# Patient Record
Sex: FEMALE | Race: WHITE | NOT HISPANIC OR LATINO | Employment: FULL TIME | URBAN - METROPOLITAN AREA
[De-identification: names, ages, dates, MRNs, and addresses within clinical notes are randomized per-mention and may not be internally consistent; named-entity substitution may affect disease eponyms.]

---

## 2017-09-18 ENCOUNTER — TELEPHONE (OUTPATIENT)
Dept: PRIMARY CARE CLINIC | Facility: CLINIC | Age: 52
End: 2017-09-18

## 2017-09-18 NOTE — TELEPHONE ENCOUNTER
----- Message from Reina Willett sent at 9/15/2017 11:49 AM CDT -----  Contact:   call  //[phone]    Pt  Is  Having   anxiety  And  Panic  Attacks  And  Wants  To  Be see today //  Pt  Stated   She has  [employer]  ,  Card not available ////  Pt was advise  About    250  Dep  If  Not ins  Verification

## 2017-09-21 ENCOUNTER — OFFICE VISIT (OUTPATIENT)
Dept: PRIMARY CARE CLINIC | Facility: CLINIC | Age: 52
End: 2017-09-21

## 2017-09-21 VITALS
TEMPERATURE: 98 F | BODY MASS INDEX: 26.13 KG/M2 | WEIGHT: 142 LBS | HEART RATE: 74 BPM | HEIGHT: 62 IN | SYSTOLIC BLOOD PRESSURE: 113 MMHG | RESPIRATION RATE: 18 BRPM | DIASTOLIC BLOOD PRESSURE: 76 MMHG | OXYGEN SATURATION: 100 %

## 2017-09-21 DIAGNOSIS — F41.9 ANXIETY: ICD-10-CM

## 2017-09-21 DIAGNOSIS — S39.012A LUMBOSACRAL STRAIN, INITIAL ENCOUNTER: Primary | ICD-10-CM

## 2017-09-21 DIAGNOSIS — N95.1 MENOPAUSAL SYNDROME: ICD-10-CM

## 2017-09-21 DIAGNOSIS — K50.919 CROHN'S DISEASE WITH COMPLICATION, UNSPECIFIED GASTROINTESTINAL TRACT LOCATION: ICD-10-CM

## 2017-09-21 DIAGNOSIS — E66.3 OVERWEIGHT: ICD-10-CM

## 2017-09-21 PROCEDURE — 99213 OFFICE O/P EST LOW 20 MIN: CPT | Mod: 25,S$GLB,, | Performed by: FAMILY MEDICINE

## 2017-09-21 PROCEDURE — 96372 THER/PROPH/DIAG INJ SC/IM: CPT | Mod: S$GLB,,, | Performed by: FAMILY MEDICINE

## 2017-09-21 RX ORDER — METHYLPREDNISOLONE 4 MG/1
TABLET ORAL
Qty: 1 PACKAGE | Refills: 0 | Status: SHIPPED | OUTPATIENT
Start: 2017-09-21 | End: 2017-10-12

## 2017-09-21 RX ORDER — DICLOFENAC SODIUM 75 MG/1
75 TABLET, DELAYED RELEASE ORAL DAILY
COMMUNITY
End: 2017-09-21 | Stop reason: SDUPTHER

## 2017-09-21 RX ORDER — DICLOFENAC SODIUM 75 MG/1
TABLET, DELAYED RELEASE ORAL
Qty: 60 TABLET | Refills: 5 | Status: SHIPPED | OUTPATIENT
Start: 2017-09-21 | End: 2018-07-03

## 2017-09-21 RX ORDER — BETAMETHASONE SODIUM PHOSPHATE AND BETAMETHASONE ACETATE 3; 3 MG/ML; MG/ML
6 INJECTION, SUSPENSION INTRA-ARTICULAR; INTRALESIONAL; INTRAMUSCULAR; SOFT TISSUE
Status: COMPLETED | OUTPATIENT
Start: 2017-09-21 | End: 2017-09-21

## 2017-09-21 RX ORDER — CYCLOBENZAPRINE HCL 10 MG
10 TABLET ORAL 3 TIMES DAILY PRN
Qty: 30 TABLET | Refills: 1 | Status: SHIPPED | OUTPATIENT
Start: 2017-09-21 | End: 2017-10-01

## 2017-09-21 RX ORDER — HYDROCODONE BITARTRATE AND ACETAMINOPHEN 5; 325 MG/1; MG/1
1 TABLET ORAL EVERY 6 HOURS PRN
Qty: 30 TABLET | Refills: 0 | Status: SHIPPED | OUTPATIENT
Start: 2017-09-21 | End: 2018-07-03

## 2017-09-21 RX ADMIN — BETAMETHASONE SODIUM PHOSPHATE AND BETAMETHASONE ACETATE 6 MG: 3; 3 INJECTION, SUSPENSION INTRA-ARTICULAR; INTRALESIONAL; INTRAMUSCULAR; SOFT TISSUE at 12:09

## 2017-09-21 NOTE — PROGRESS NOTES
Subjective:       Patient ID: Sana Braden is a 52 y.o. female.    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 Able get up just sore . Using back brace helps little. Hurt bend, lift, squatt. Wakes pt up sleep. Sore getting OOB--OK dressing sore bending over. Hurt back long time ago lifting heavy stuff. No luous, Rhey arth, gout, no OA or fx. Work -- [employer].     ROS:  Skin: no psoriasis, eczema, +skin cancer 5 yrs ago   HEENT: No headache, ocular pain, blurred vision, diplopia, epistaxis, hoarseness change in voice, thyroid trouble  Lung: No pneumonia, asthma, Tb, wheezing, SOB,  Heart: No chest pain, ankle edema, palpitations, MI, phu murmur, hypertension, hyperlipidemia  Abdomen:Crohn's --see HPI  No nausea, vomiting, diarrhea, constipation, ulcers, hepatitis, gallbladder disease, melena, hematochezia, hematemesis  : no UTI, renal disease, stones  GYN hyst -- night sweats, nausea, irritable , tired--OTC estrogen --?? Menopause  -- wants see GYN  MS: no fractures, O/A, lupus, rheumatoid, gout  Neuro: No dizziness, LOC, seizures   No diabetes, no anemia, no anxiety, no depression  Single , no children lives alone      Objective:   Physical Exam:  General: Well nourished, well developed, no acute distress  Skin: No lesions  HEENT: Eyes PERRLA, EOM intact, nose patent, throat non-erythematous   NECK: Supple, no bruits, No JVD, no nodes  Lungs: Clear, no rales, rhonchi, wheezing  Heart: Regular rate and rhythm, no murmurs, gallops, or rubs  Abdomen: flat, bowel sounds positive, no tenderness, or organomegaly  MS: Range of motion and muscle strength intact  Neuro: Alert, CN intact, oriented X 3  Extremities: No cyanosis, clubbing, or edema         Assessment:       1. Lumbosacral strain, initial encounter    2. Crohn's disease with complication, unspecified gastrointestinal tract location    3. Anxiety    4.  Overweight    5. Menopausal syndrome        Plan:       Lumbosacral strain, initial encounter  -     X-Ray Lumbar Spine Complete 5 View; Future; Expected date: 09/21/2017    Crohn's disease with complication, unspecified gastrointestinal tract location    Anxiety    Overweight    Menopausal syndrome  -     Ambulatory Referral to Obstetrics / Gynecology  -     Mammo Digital Screening Bilateral With CAD; Future; Expected date: 09/21/2017    Other orders  -     betamethasone acetate-betamethasone sodium phosphate injection 6 mg; Inject 1 mL (6 mg total) into the muscle one time.  -     hydrocodone-acetaminophen 5-325mg (NORCO) 5-325 mg per tablet; Take 1 tablet by mouth every 6 (six) hours as needed.  Dispense: 30 tablet; Refill: 0  -     methylPREDNISolone (MEDROL DOSEPACK) 4 mg tablet; use as directed  Dispense: 1 Package; Refill: 0  -     cyclobenzaprine (FLEXERIL) 10 MG tablet; Take 1 tablet (10 mg total) by mouth 3 (three) times daily as needed for Muscle spasms.  Dispense: 30 tablet; Refill: 1  -     diclofenac (VOLTAREN) 75 MG EC tablet; 1 po bid after medrol and no other ND+SAID's  Dispense: 60 tablet; Refill: 5

## 2018-07-03 ENCOUNTER — OFFICE VISIT (OUTPATIENT)
Dept: PRIMARY CARE CLINIC | Facility: CLINIC | Age: 53
End: 2018-07-03
Payer: COMMERCIAL

## 2018-07-03 VITALS
DIASTOLIC BLOOD PRESSURE: 83 MMHG | WEIGHT: 143.19 LBS | RESPIRATION RATE: 18 BRPM | TEMPERATURE: 99 F | HEIGHT: 61 IN | HEART RATE: 84 BPM | SYSTOLIC BLOOD PRESSURE: 126 MMHG | OXYGEN SATURATION: 98 % | BODY MASS INDEX: 27.03 KG/M2

## 2018-07-03 DIAGNOSIS — N39.0 RECURRENT UTI: Primary | ICD-10-CM

## 2018-07-03 LAB
BILIRUB SERPL-MCNC: ABNORMAL MG/DL
BLOOD URINE, POC: ABNORMAL
COLOR, POC UA: YELLOW
GLUCOSE UR QL STRIP: NEGATIVE
KETONES UR QL STRIP: NEGATIVE
LEUKOCYTE ESTERASE URINE, POC: ABNORMAL
NITRITE, POC UA: NEGATIVE
PH, POC UA: 5
PROTEIN, POC: NEGATIVE
SPECIFIC GRAVITY, POC UA: 1
UROBILINOGEN, POC UA: NEGATIVE

## 2018-07-03 PROCEDURE — 99213 OFFICE O/P EST LOW 20 MIN: CPT | Mod: 25,S$GLB,, | Performed by: INTERNAL MEDICINE

## 2018-07-03 PROCEDURE — 99999 PR PBB SHADOW E&M-EST. PATIENT-LVL IV: CPT | Mod: PBBFAC,,, | Performed by: INTERNAL MEDICINE

## 2018-07-03 PROCEDURE — 81002 URINALYSIS NONAUTO W/O SCOPE: CPT | Mod: S$GLB,,, | Performed by: INTERNAL MEDICINE

## 2018-07-03 PROCEDURE — 3008F BODY MASS INDEX DOCD: CPT | Mod: CPTII,S$GLB,, | Performed by: INTERNAL MEDICINE

## 2018-07-03 RX ORDER — HYDROCODONE BITARTRATE AND ACETAMINOPHEN 5; 325 MG/1; MG/1
1 TABLET ORAL EVERY 6 HOURS PRN
Qty: 20 TABLET | Refills: 0 | Status: SHIPPED | OUTPATIENT
Start: 2018-07-03 | End: 2018-08-23 | Stop reason: ALTCHOICE

## 2018-07-03 RX ORDER — SULFAMETHOXAZOLE AND TRIMETHOPRIM 800; 160 MG/1; MG/1
1 TABLET ORAL 2 TIMES DAILY
Qty: 20 TABLET | Refills: 0 | Status: SHIPPED | OUTPATIENT
Start: 2018-07-03 | End: 2018-07-13

## 2018-07-03 RX ORDER — PHENAZOPYRIDINE HYDROCHLORIDE 200 MG/1
200 TABLET, FILM COATED ORAL 3 TIMES DAILY
Qty: 6 TABLET | Refills: 0 | Status: SHIPPED | OUTPATIENT
Start: 2018-07-03 | End: 2018-07-05

## 2018-07-03 NOTE — PROGRESS NOTES
Subjective:       Patient ID: Sana Braden is a 53 y.o. female.    Chief Complaint: Urinary Tract Infection    HPI  Pt c/o burning with urination since yesterday and see blood in urine last night and rt flank pain no fever chill no n/v/d had UTI x 3 this yr  Review of Systems    Objective:      Physical Exam   Constitutional: She is oriented to person, place, and time. She appears well-developed and well-nourished. No distress.   HENT:   Head: Normocephalic and atraumatic.   Right Ear: External ear normal.   Left Ear: External ear normal.   Nose: Nose normal.   Mouth/Throat: Oropharynx is clear and moist. No oropharyngeal exudate.   Eyes: Conjunctivae and EOM are normal. Pupils are equal, round, and reactive to light. Right eye exhibits no discharge. Left eye exhibits no discharge.   Neck: Normal range of motion. Neck supple. No thyromegaly present.   Cardiovascular: Normal rate, regular rhythm, normal heart sounds and intact distal pulses.  Exam reveals no gallop and no friction rub.    No murmur heard.  Pulmonary/Chest: Effort normal and breath sounds normal. No respiratory distress. She has no wheezes. She has no rales. She exhibits no tenderness.   Abdominal: Soft. Bowel sounds are normal. She exhibits no distension. There is no tenderness. There is no rebound and no guarding.   Musculoskeletal: Normal range of motion. She exhibits no edema, tenderness or deformity.   Lymphadenopathy:     She has no cervical adenopathy.   Neurological: She is alert and oriented to person, place, and time.   Skin: Skin is warm and dry. Capillary refill takes less than 2 seconds. No rash noted. No erythema.   Psychiatric: She has a normal mood and affect. Judgment and thought content normal.   Nursing note and vitals reviewed.      Assessment:       1. Recurrent UTI        Plan:       Recurrent UTI  -     sulfamethoxazole-trimethoprim 800-160mg (BACTRIM DS) 800-160 mg Tab; Take 1 tablet by mouth 2 (two) times daily. for 10 days   Dispense: 20 tablet; Refill: 0  -     phenazopyridine (PYRIDIUM) 200 MG tablet; Take 1 tablet (200 mg total) by mouth 3 (three) times daily. for 2 days  Dispense: 6 tablet; Refill: 0  -     HYDROcodone-acetaminophen (NORCO) 5-325 mg per tablet; Take 1 tablet by mouth every 6 (six) hours as needed for Pain.  Dispense: 20 tablet; Refill: 0  -     US Retroperitoneal Complete; Future; Expected date: 07/04/2018  -     POCT URINE DIPSTICK WITHOUT MICROSCOPE

## 2018-07-09 ENCOUNTER — TELEPHONE (OUTPATIENT)
Dept: PRIMARY CARE CLINIC | Facility: CLINIC | Age: 53
End: 2018-07-09

## 2018-07-09 NOTE — TELEPHONE ENCOUNTER
----- Message from Evy Delacruz sent at 7/9/2018  3:21 PM CDT -----  Contact: Patient  Type:  Patient Returning Call    Who Called:  Sana,patient  Who Left Message for Patient:  ?  Does the patient know what this is regarding?:  Results  Best Call Back Number:  [phone]  Additional Information:  Missed your call, please call her back. Thanks.

## 2018-07-09 NOTE — TELEPHONE ENCOUNTER
----- Message from Evy Delacruz sent at 7/9/2018  3:21 PM CDT -----  Contact: Patient  Type:  Patient Returning Call    Who Called:  Sana,patient  Who Left Message for Patient:  ?  Does the patient know what this is regarding?:  Results  Best Call Back Number:  [phone]  Additional Information:  Missed your call, please call her back. Thanks.

## 2018-08-23 ENCOUNTER — OFFICE VISIT (OUTPATIENT)
Dept: PRIMARY CARE CLINIC | Facility: CLINIC | Age: 53
End: 2018-08-23
Payer: COMMERCIAL

## 2018-08-23 VITALS
TEMPERATURE: 99 F | RESPIRATION RATE: 18 BRPM | WEIGHT: 142 LBS | DIASTOLIC BLOOD PRESSURE: 74 MMHG | OXYGEN SATURATION: 99 % | BODY MASS INDEX: 26.81 KG/M2 | SYSTOLIC BLOOD PRESSURE: 115 MMHG | HEART RATE: 76 BPM | HEIGHT: 61 IN

## 2018-08-23 DIAGNOSIS — N30.00 ACUTE CYSTITIS WITHOUT HEMATURIA: Primary | ICD-10-CM

## 2018-08-23 DIAGNOSIS — Z12.11 COLON CANCER SCREENING: ICD-10-CM

## 2018-08-23 DIAGNOSIS — Z00.00 WELLNESS EXAMINATION: ICD-10-CM

## 2018-08-23 DIAGNOSIS — K50.919 CROHN'S DISEASE WITH COMPLICATION, UNSPECIFIED GASTROINTESTINAL TRACT LOCATION: ICD-10-CM

## 2018-08-23 DIAGNOSIS — M54.50 ACUTE MIDLINE LOW BACK PAIN WITHOUT SCIATICA: ICD-10-CM

## 2018-08-23 DIAGNOSIS — Z12.31 ENCOUNTER FOR SCREENING MAMMOGRAM FOR BREAST CANCER: ICD-10-CM

## 2018-08-23 LAB
BILIRUB SERPL-MCNC: ABNORMAL MG/DL
BLOOD URINE, POC: ABNORMAL
COLOR, POC UA: YELLOW
GLUCOSE UR QL STRIP: NORMAL
KETONES UR QL STRIP: ABNORMAL
LEUKOCYTE ESTERASE URINE, POC: ABNORMAL
NITRITE, POC UA: ABNORMAL
PH, POC UA: 5
PROTEIN, POC: ABNORMAL
SPECIFIC GRAVITY, POC UA: 1.01
UROBILINOGEN, POC UA: NORMAL

## 2018-08-23 PROCEDURE — 99213 OFFICE O/P EST LOW 20 MIN: CPT | Mod: 25,S$GLB,, | Performed by: INTERNAL MEDICINE

## 2018-08-23 PROCEDURE — 81002 URINALYSIS NONAUTO W/O SCOPE: CPT | Mod: S$GLB,,, | Performed by: INTERNAL MEDICINE

## 2018-08-23 PROCEDURE — 99999 PR PBB SHADOW E&M-EST. PATIENT-LVL V: CPT | Mod: PBBFAC,,, | Performed by: INTERNAL MEDICINE

## 2018-08-23 PROCEDURE — 3008F BODY MASS INDEX DOCD: CPT | Mod: CPTII,S$GLB,, | Performed by: INTERNAL MEDICINE

## 2018-08-23 RX ORDER — CIPROFLOXACIN 500 MG/1
500 TABLET ORAL EVERY 12 HOURS
Qty: 20 TABLET | Refills: 0 | Status: SHIPPED | OUTPATIENT
Start: 2018-08-23 | End: 2018-09-02

## 2018-08-23 RX ORDER — HYDROCODONE BITARTRATE AND ACETAMINOPHEN 5; 325 MG/1; MG/1
1 TABLET ORAL EVERY 8 HOURS PRN
Qty: 15 TABLET | Refills: 0 | Status: SHIPPED | OUTPATIENT
Start: 2018-08-23 | End: 2018-11-05

## 2018-08-24 NOTE — PROGRESS NOTES
Subjective:       Patient ID: Sana Braden is a 53 y.o. female.    Chief Complaint: Urinary Tract Infection    HPI  Pt c/o UTI symptoms lower back pain urinary frequency lower abd pain x 3 days not better has Crohn ds had arie scope yrly not pregnant no fever chilll no n/v/d  Review of Systems    Objective:      Physical Exam   Constitutional: She is oriented to person, place, and time. She appears well-developed and well-nourished. No distress.   HENT:   Head: Normocephalic and atraumatic.   Right Ear: External ear normal.   Left Ear: External ear normal.   Nose: Nose normal.   Mouth/Throat: Oropharynx is clear and moist. No oropharyngeal exudate.   Eyes: Conjunctivae and EOM are normal. Pupils are equal, round, and reactive to light. Right eye exhibits no discharge. Left eye exhibits no discharge.   Neck: Normal range of motion. Neck supple. No thyromegaly present.   Cardiovascular: Normal rate, regular rhythm, normal heart sounds and intact distal pulses. Exam reveals no gallop and no friction rub.   No murmur heard.  Pulmonary/Chest: Effort normal and breath sounds normal. No respiratory distress. She has no wheezes. She has no rales. She exhibits no tenderness.   Abdominal: Soft. Bowel sounds are normal. She exhibits no distension. There is no tenderness. There is no rebound and no guarding.   Musculoskeletal: Normal range of motion. She exhibits no edema, tenderness or deformity.   Lymphadenopathy:     She has no cervical adenopathy.   Neurological: She is alert and oriented to person, place, and time.   Skin: Skin is warm and dry. Capillary refill takes less than 2 seconds. No rash noted. No erythema.   Psychiatric: She has a normal mood and affect. Judgment and thought content normal.   Nursing note and vitals reviewed.      Assessment:       1. Acute cystitis without hematuria    2. Crohn's disease with complication, unspecified gastrointestinal tract location    3. Acute midline low back pain without  sciatica    4. Encounter for screening mammogram for breast cancer        Plan:       Acute cystitis without hematuria  -     POCT URINE DIPSTICK WITHOUT MICROSCOPE  -     ciprofloxacin HCl (CIPRO) 500 MG tablet; Take 1 tablet (500 mg total) by mouth every 12 (twelve) hours. for 10 days  Dispense: 20 tablet; Refill: 0    Crohn's disease with complication, unspecified gastrointestinal tract location    Acute midline low back pain without sciatica  -     HYDROcodone-acetaminophen (NORCO) 5-325 mg per tablet; Take 1 tablet by mouth every 8 (eight) hours as needed for Pain.  Dispense: 15 tablet; Refill: 0    Encounter for screening mammogram for breast cancer  -     Mammo Digital Screening Bilat without CA; Future; Expected date: 09/06/2018

## 2018-11-05 ENCOUNTER — OFFICE VISIT (OUTPATIENT)
Dept: PRIMARY CARE CLINIC | Facility: CLINIC | Age: 53
End: 2018-11-05
Payer: COMMERCIAL

## 2018-11-05 VITALS
TEMPERATURE: 98 F | DIASTOLIC BLOOD PRESSURE: 80 MMHG | SYSTOLIC BLOOD PRESSURE: 124 MMHG | HEART RATE: 77 BPM | WEIGHT: 146 LBS | BODY MASS INDEX: 27.56 KG/M2 | HEIGHT: 61 IN | RESPIRATION RATE: 18 BRPM | OXYGEN SATURATION: 97 %

## 2018-11-05 DIAGNOSIS — J01.90 ACUTE NON-RECURRENT SINUSITIS, UNSPECIFIED LOCATION: Primary | ICD-10-CM

## 2018-11-05 DIAGNOSIS — R41.840 ATTENTION DEFICIT: ICD-10-CM

## 2018-11-05 PROCEDURE — 99999 PR PBB SHADOW E&M-EST. PATIENT-LVL IV: CPT | Mod: PBBFAC,,, | Performed by: INTERNAL MEDICINE

## 2018-11-05 PROCEDURE — 99213 OFFICE O/P EST LOW 20 MIN: CPT | Mod: 25,S$GLB,, | Performed by: INTERNAL MEDICINE

## 2018-11-05 PROCEDURE — 96372 THER/PROPH/DIAG INJ SC/IM: CPT | Mod: S$GLB,,, | Performed by: INTERNAL MEDICINE

## 2018-11-05 PROCEDURE — 3008F BODY MASS INDEX DOCD: CPT | Mod: CPTII,S$GLB,, | Performed by: INTERNAL MEDICINE

## 2018-11-05 RX ORDER — AMOXICILLIN AND CLAVULANATE POTASSIUM 875; 125 MG/1; MG/1
1 TABLET, FILM COATED ORAL EVERY 12 HOURS
Qty: 20 TABLET | Refills: 0 | Status: SHIPPED | OUTPATIENT
Start: 2018-11-05 | End: 2019-01-17

## 2018-11-05 RX ORDER — PREDNISONE 20 MG/1
20 TABLET ORAL 2 TIMES DAILY
Qty: 10 TABLET | Refills: 0 | Status: SHIPPED | OUTPATIENT
Start: 2018-11-05 | End: 2018-11-10

## 2018-11-05 RX ORDER — BETAMETHASONE SODIUM PHOSPHATE AND BETAMETHASONE ACETATE 3; 3 MG/ML; MG/ML
12 INJECTION, SUSPENSION INTRA-ARTICULAR; INTRALESIONAL; INTRAMUSCULAR; SOFT TISSUE
Status: COMPLETED | OUTPATIENT
Start: 2018-11-05 | End: 2018-11-05

## 2018-11-05 RX ORDER — GUAIFENESIN 600 MG/1
1200 TABLET, EXTENDED RELEASE ORAL 2 TIMES DAILY
Qty: 40 TABLET | Refills: 0 | COMMUNITY
Start: 2018-11-05 | End: 2018-11-15

## 2018-11-05 RX ADMIN — BETAMETHASONE SODIUM PHOSPHATE AND BETAMETHASONE ACETATE 12 MG: 3; 3 INJECTION, SUSPENSION INTRA-ARTICULAR; INTRALESIONAL; INTRAMUSCULAR; SOFT TISSUE at 04:11

## 2018-11-06 NOTE — PROGRESS NOTES
Subjective:       Patient ID: Sana Braden is a 53 y.o. female.    Chief Complaint: Sinusitis    HPI  patient complained of sinus congestion and tender on the left side of her face in the area of maxillary sinus and left side of her neck no fever chills no nausea vomiting no history of allergy or sinus infection in the past patient also complained of problem with paying attention at work not able to concentrate to do her job forgetful is to be on attention deficit disorder medication in the past and denies depression or anxiety  Review of Systems    Objective:      Physical Exam   Constitutional: She is oriented to person, place, and time. She appears well-developed and well-nourished. No distress.   HENT:   Head: Normocephalic and atraumatic.   Right Ear: External ear normal.   Left Ear: External ear normal.   Mouth/Throat: Oropharynx is clear and moist. No oropharyngeal exudate.   Nasal congestion on the left side and tenderness with percussion and slight swelling of the left malar   Eyes: Conjunctivae and EOM are normal. Pupils are equal, round, and reactive to light. Right eye exhibits no discharge. Left eye exhibits no discharge.   Neck: Normal range of motion. Neck supple. No thyromegaly present.   Cardiovascular: Normal rate, regular rhythm, normal heart sounds and intact distal pulses. Exam reveals no gallop and no friction rub.   No murmur heard.  Pulmonary/Chest: Effort normal and breath sounds normal. No respiratory distress. She has no wheezes. She has no rales. She exhibits no tenderness.   Abdominal: Soft. Bowel sounds are normal. She exhibits no distension. There is no tenderness. There is no rebound and no guarding.   Musculoskeletal: Normal range of motion. She exhibits no edema, tenderness or deformity.   Lymphadenopathy:     She has no cervical adenopathy.   Neurological: She is alert and oriented to person, place, and time.   Skin: Skin is warm and dry. Capillary refill takes less than 2  seconds. No rash noted. No erythema.   Psychiatric: She has a normal mood and affect. Judgment and thought content normal.   Nursing note and vitals reviewed.      Assessment:       1. Acute non-recurrent sinusitis, unspecified location    2. Attention deficit        Plan:       Acute non-recurrent sinusitis, unspecified location  -     betamethasone acetate-betamethasone sodium phosphate injection 12 mg  -     amoxicillin-clavulanate 875-125mg (AUGMENTIN) 875-125 mg per tablet; Take 1 tablet by mouth every 12 (twelve) hours.  Dispense: 20 tablet; Refill: 0  -     guaiFENesin (MUCINEX) 600 mg 12 hr tablet; Take 2 tablets (1,200 mg total) by mouth 2 (two) times daily. for 10 days  Dispense: 40 tablet; Refill: 0  -     predniSONE (DELTASONE) 20 MG tablet; Take 1 tablet (20 mg total) by mouth 2 (two) times daily. for 5 days  Dispense: 10 tablet; Refill: 0    Attention deficit  -     Ambulatory Referral to Psychiatry

## 2019-02-04 NOTE — TELEPHONE ENCOUNTER
----- Message from Vanessa Mccain sent at 7/9/2018  4:49 PM CDT -----  Returning your call.  Please call patient at [phone].   yes

## 2019-03-08 ENCOUNTER — OFFICE VISIT (OUTPATIENT)
Dept: PRIMARY CARE CLINIC | Facility: CLINIC | Age: 54
End: 2019-03-08
Payer: COMMERCIAL

## 2019-03-08 VITALS
SYSTOLIC BLOOD PRESSURE: 107 MMHG | DIASTOLIC BLOOD PRESSURE: 70 MMHG | TEMPERATURE: 98 F | HEART RATE: 64 BPM | WEIGHT: 146 LBS | BODY MASS INDEX: 27.56 KG/M2 | HEIGHT: 61 IN | RESPIRATION RATE: 18 BRPM | OXYGEN SATURATION: 97 %

## 2019-03-08 DIAGNOSIS — J40 BRONCHITIS: ICD-10-CM

## 2019-03-08 DIAGNOSIS — J32.9 SINUSITIS, UNSPECIFIED CHRONICITY, UNSPECIFIED LOCATION: Primary | ICD-10-CM

## 2019-03-08 DIAGNOSIS — J98.01 BRONCHOSPASM: ICD-10-CM

## 2019-03-08 DIAGNOSIS — E66.3 OVERWEIGHT: ICD-10-CM

## 2019-03-08 DIAGNOSIS — K50.919 CROHN'S DISEASE WITH COMPLICATION, UNSPECIFIED GASTROINTESTINAL TRACT LOCATION: ICD-10-CM

## 2019-03-08 LAB
CTP QC/QA: YES
FLUAV AG NPH QL: NEGATIVE
FLUBV AG NPH QL: NEGATIVE

## 2019-03-08 PROCEDURE — 87804 POCT INFLUENZA A/B: ICD-10-PCS | Mod: QW,S$GLB,, | Performed by: FAMILY MEDICINE

## 2019-03-08 PROCEDURE — 99213 OFFICE O/P EST LOW 20 MIN: CPT | Mod: 25,S$GLB,, | Performed by: FAMILY MEDICINE

## 2019-03-08 PROCEDURE — 3008F BODY MASS INDEX DOCD: CPT | Mod: CPTII,S$GLB,, | Performed by: FAMILY MEDICINE

## 2019-03-08 PROCEDURE — 96372 PR INJECTION,THERAP/PROPH/DIAG2ST, IM OR SUBCUT: ICD-10-PCS | Mod: S$GLB,,, | Performed by: FAMILY MEDICINE

## 2019-03-08 PROCEDURE — 87804 INFLUENZA ASSAY W/OPTIC: CPT | Mod: QW,S$GLB,, | Performed by: FAMILY MEDICINE

## 2019-03-08 PROCEDURE — 99999 PR PBB SHADOW E&M-EST. PATIENT-LVL III: CPT | Mod: PBBFAC,,, | Performed by: FAMILY MEDICINE

## 2019-03-08 PROCEDURE — 96372 THER/PROPH/DIAG INJ SC/IM: CPT | Mod: S$GLB,,, | Performed by: FAMILY MEDICINE

## 2019-03-08 PROCEDURE — 99999 PR PBB SHADOW E&M-EST. PATIENT-LVL III: ICD-10-PCS | Mod: PBBFAC,,, | Performed by: FAMILY MEDICINE

## 2019-03-08 PROCEDURE — 3008F PR BODY MASS INDEX (BMI) DOCUMENTED: ICD-10-PCS | Mod: CPTII,S$GLB,, | Performed by: FAMILY MEDICINE

## 2019-03-08 PROCEDURE — 99213 PR OFFICE/OUTPT VISIT, EST, LEVL III, 20-29 MIN: ICD-10-PCS | Mod: 25,S$GLB,, | Performed by: FAMILY MEDICINE

## 2019-03-08 RX ORDER — PROMETHAZINE HYDROCHLORIDE AND CODEINE PHOSPHATE 6.25; 1 MG/5ML; MG/5ML
5 SOLUTION ORAL EVERY 6 HOURS PRN
Qty: 180 ML | Refills: 0 | Status: SHIPPED | OUTPATIENT
Start: 2019-03-08 | End: 2019-05-09

## 2019-03-08 RX ORDER — TRIAMCINOLONE ACETONIDE 40 MG/ML
40 INJECTION, SUSPENSION INTRA-ARTICULAR; INTRAMUSCULAR
Status: COMPLETED | OUTPATIENT
Start: 2019-03-08 | End: 2019-03-08

## 2019-03-08 RX ORDER — AZITHROMYCIN 250 MG/1
TABLET, FILM COATED ORAL
Qty: 6 TABLET | Refills: 0 | Status: SHIPPED | OUTPATIENT
Start: 2019-03-08 | End: 2019-03-12

## 2019-03-08 RX ORDER — METHYLPREDNISOLONE 4 MG/1
TABLET ORAL
Qty: 1 PACKAGE | Refills: 0 | Status: SHIPPED | OUTPATIENT
Start: 2019-03-08 | End: 2019-05-09

## 2019-03-08 RX ADMIN — TRIAMCINOLONE ACETONIDE 40 MG: 40 INJECTION, SUSPENSION INTRA-ARTICULAR; INTRAMUSCULAR at 01:03

## 2019-03-08 NOTE — PROGRESS NOTES
Subjective:       Patient ID: Sana Braden is a 54 y.o. female.    Chief Complaint: cold symptoms (for over a week)    HPI:  54-year-old female in for cold x1 week--slight fever, +runny nose, +sore throat, +slight cough, +phlegm--green.  No pneumonia asthma TB. + smoking1/2 pack per day  Slight shortness of breath slight wheeze does not have an inhaler    ROS:  Skin: no psoriasis, eczema, skin cancer  HEENT: + headache--occipital area then to the frontal area,no  ocular pain, blurred vision, diplopia, epistaxis, hoarseness change in voice, thyroid trouble  Lung: No pneumonia, asthma, Tb, wheezing, SOB, see history of present illness  Heart: No chest pain, ankle edema, palpitations, MI, phu murmur, hypertension, hyperlipidemia  Abdomen: No nausea, vomiting, diarrhea, constipation, ulcers, hepatitis, gallbladder disease, melena, hematochezia, hematemesis  : no UTI, renal disease, stones  MS: no fractures, O/A, lupus, rheumatoid, gout  Neuro: No dizziness, LOC, seizures   No diabetes, no anemia, no anxiety, no depression   Single, no children, work [employer], Lives with sister     Objective:   Physical Exam:  General: Well nourished, well developed, no acute distress  Skin: No lesions  HEENT: Eyes PERRLA, EOM intact, nose +clear discharge, throat 1/4 erythematous ears TMs clear  NECK: Supple, no bruits, No JVD, no nodes  Lungs: Clear, no rales, rhonchi, wheezing +coarse cough  Heart: Regular rate and rhythm, no murmurs, gallops, or rubs  Abdomen: flat, bowel sounds positive, no tenderness, or organomegaly  MS: Range of motion and muscle strength intact  Neuro: Alert, CN intact, oriented X 3  Extremities: No cyanosis, clubbing, or edema         Assessment:       1. Sinusitis, unspecified chronicity, unspecified location    2. Bronchitis    3. Bronchospasm    4. Overweight    5. Crohn's disease with complication, unspecified gastrointestinal tract location        Plan:       Sinusitis, unspecified chronicity,  unspecified location  -     POCT INFLUENZA A/B    Bronchitis  -     POCT INFLUENZA A/B    Bronchospasm    Overweight    Crohn's disease with complication, unspecified gastrointestinal tract location    Other orders  -     triamcinolone acetonide injection 40 mg  -     methylPREDNISolone (MEDROL DOSEPACK) 4 mg tablet; use as directed  Dispense: 1 Package; Refill: 0  -     azithromycin (Z-INDIA) 250 MG tablet; 2 tabs by mouth day 1, then 1 tab by mouth daily x 4 days  Dispense: 6 tablet; Refill: 0  -     promethazine-codeine 6.25-10 mg/5 ml (PHENERGAN WITH CODEINE) 6.25-10 mg/5 mL syrup; Take 5 mLs by mouth every 6 (six) hours as needed for Cough.  Dispense: 180 mL; Refill: 0      cold--Kenalog/Zithromax/Medrol/Phenergan with codeine  flu swab  Drink lots of fluids/rest/use Tylenol or ibuprofen for fever

## 2019-03-08 NOTE — PROGRESS NOTES
Patient verified by name and . Allergies verified. 40 mg kenalog given im to right ventrogluteal using aseptic technique Patient tolerated well

## 2019-05-09 ENCOUNTER — OFFICE VISIT (OUTPATIENT)
Dept: PRIMARY CARE CLINIC | Facility: CLINIC | Age: 54
End: 2019-05-09
Payer: COMMERCIAL

## 2019-05-09 VITALS
SYSTOLIC BLOOD PRESSURE: 97 MMHG | HEART RATE: 70 BPM | OXYGEN SATURATION: 98 % | TEMPERATURE: 98 F | WEIGHT: 142.88 LBS | DIASTOLIC BLOOD PRESSURE: 64 MMHG | HEIGHT: 61 IN | BODY MASS INDEX: 26.98 KG/M2 | RESPIRATION RATE: 18 BRPM

## 2019-05-09 DIAGNOSIS — S39.012A LUMBOSACRAL STRAIN, INITIAL ENCOUNTER: ICD-10-CM

## 2019-05-09 DIAGNOSIS — E66.3 OVERWEIGHT: ICD-10-CM

## 2019-05-09 DIAGNOSIS — Z12.31 VISIT FOR SCREENING MAMMOGRAM: ICD-10-CM

## 2019-05-09 DIAGNOSIS — K50.919 CROHN'S DISEASE WITH COMPLICATION, UNSPECIFIED GASTROINTESTINAL TRACT LOCATION: ICD-10-CM

## 2019-05-09 DIAGNOSIS — F41.9 ANXIETY: ICD-10-CM

## 2019-05-09 DIAGNOSIS — G25.81 RESTLESS LEG SYNDROME: Primary | ICD-10-CM

## 2019-05-09 PROCEDURE — 99999 PR PBB SHADOW E&M-EST. PATIENT-LVL III: CPT | Mod: PBBFAC,,, | Performed by: FAMILY MEDICINE

## 2019-05-09 PROCEDURE — 99213 OFFICE O/P EST LOW 20 MIN: CPT | Mod: S$GLB,,, | Performed by: FAMILY MEDICINE

## 2019-05-09 PROCEDURE — 99999 PR PBB SHADOW E&M-EST. PATIENT-LVL III: ICD-10-PCS | Mod: PBBFAC,,, | Performed by: FAMILY MEDICINE

## 2019-05-09 PROCEDURE — 3008F PR BODY MASS INDEX (BMI) DOCUMENTED: ICD-10-PCS | Mod: CPTII,S$GLB,, | Performed by: FAMILY MEDICINE

## 2019-05-09 PROCEDURE — 99213 PR OFFICE/OUTPT VISIT, EST, LEVL III, 20-29 MIN: ICD-10-PCS | Mod: S$GLB,,, | Performed by: FAMILY MEDICINE

## 2019-05-09 PROCEDURE — 3008F BODY MASS INDEX DOCD: CPT | Mod: CPTII,S$GLB,, | Performed by: FAMILY MEDICINE

## 2019-05-09 RX ORDER — METHOCARBAMOL 750 MG/1
TABLET, FILM COATED ORAL
Qty: 40 TABLET | Refills: 5 | Status: SHIPPED | OUTPATIENT
Start: 2019-05-09 | End: 2019-10-28

## 2019-05-09 RX ORDER — GABAPENTIN 300 MG/1
300 CAPSULE ORAL 3 TIMES DAILY
Qty: 90 CAPSULE | Refills: 11 | Status: SHIPPED | OUTPATIENT
Start: 2019-05-09 | End: 2019-10-28

## 2019-05-09 NOTE — PROGRESS NOTES
Subjective:       Patient ID: Sana Braden is a 54 y.o. female.    Chief Complaint: restless legs (patients wants a rx for RLS)    HPI:  54-year-old female --patient with history of restless leg syndrome--seems to be relieved with gabapentin 1 at bedtime 300 mg.  Wakes patient up --patient has been unable sleep for the past 2 night--pain and cramping in both legs.  No lupus rheumatoid gout--no fracture-osteoarthritis.  Patient walks 3 miles per day.  Does have some pain in the lower backl.  ROS:  Skin: no psoriasis, eczema, skin cancer  HEENT: no  headache ,no  ocular pain, blurred vision, diplopia, epistaxis, hoarseness change in voice, thyroid trouble history of squamous cell carcinoma-skin cancer  Lung: No pneumonia, asthma, Tb, wheezing, SOB, see history of present illness  Heart: No chest pain, ankle edema, palpitations, MI, phu murmur, hypertension, hyperlipidemia  Abdomen: No nausea, vomiting, diarrhea, constipation, ulcers, hepatitis, gallbladder disease, melena, hematochezia, hematemesis +history Crohn's disease been doing pretty well  : no UTI, renal disease, stones   GYN hyst, no mammogram   MS: no fractures, O/A, lupus, rheumatoid, gout  Neuro: No dizziness, LOC, seizures    No diabetes, no anemia, no anxiety, no depression   Single, no children, work [employer], Lives with sister     Objective:   Physical Exam:  General: Well nourished, well developed, no acute distress  Skin: No lesions  HEENT: Eyes PERRLA, EOM intact, nose clear , throat nonerythematous ears TMs clear  NECK: Supple, no bruits, No JVD, no nodes  Lungs: Clear, no rales, rhonchi, wheezing   Heart: Regular rate and rhythm, no murmurs, gallops, or rubs  Abdomen: flat, bowel sounds positive, no tenderness, or organomegaly  MS: Range of motion and muscle strength intact reflexes 2/4  Neuro: Alert, CN intact, oriented X 3 Romberg negative heel-toe intact  Extremities: No cyanosis, clubbing, or edema         Assessment:       1.  Restless leg syndrome    2. Lumbosacral strain, initial encounter    3. Crohn's disease with complication, unspecified gastrointestinal tract location    4. Overweight    5. Anxiety        Plan:       Restless leg syndrome    Lumbosacral strain, initial encounter    Crohn's disease with complication, unspecified gastrointestinal tract location    Overweight    Anxiety      gabapentin 300 mg 1 p.o. q.h.s. if still with muscle cramps had Robaxin 750 1 p.o. q.h.s. with gabapentin--could also try quinine water  Continue exercise decrease weight  Lab due in July CBCs CMP lipid  Tetanus vaccine if patient desires  Mammogram if patient agreeable

## 2019-05-20 ENCOUNTER — TELEPHONE (OUTPATIENT)
Dept: PRIMARY CARE CLINIC | Facility: CLINIC | Age: 54
End: 2019-05-20

## 2019-05-20 DIAGNOSIS — L98.9 SKIN LESION: Primary | ICD-10-CM

## 2019-05-20 NOTE — TELEPHONE ENCOUNTER
----- Message from Venessa Escobar sent at 5/20/2019 12:58 PM CDT -----  Type: Needs Medical Advice    Who Called:  Patient  Best Call Back Number: [phone] (home)     Additional Information: Needs a referral for a dermatologist due to having previous skin cancer and she is worried about a spot on her chin. Please call to advise.

## 2019-05-28 ENCOUNTER — TELEPHONE (OUTPATIENT)
Dept: PRIMARY CARE CLINIC | Facility: CLINIC | Age: 54
End: 2019-05-28

## 2019-05-28 NOTE — TELEPHONE ENCOUNTER
----- Message from Rochelle Person sent at 5/28/2019 11:56 AM CDT -----  Contact: self  Patient [phone] is returning call to nurse for some results -  from this past Friday 05 24 19/please call

## 2019-06-10 ENCOUNTER — OFFICE VISIT (OUTPATIENT)
Dept: PRIMARY CARE CLINIC | Facility: CLINIC | Age: 54
End: 2019-06-10
Payer: COMMERCIAL

## 2019-06-10 VITALS
SYSTOLIC BLOOD PRESSURE: 104 MMHG | RESPIRATION RATE: 18 BRPM | DIASTOLIC BLOOD PRESSURE: 68 MMHG | TEMPERATURE: 98 F | OXYGEN SATURATION: 97 % | HEART RATE: 57 BPM | WEIGHT: 143.19 LBS | BODY MASS INDEX: 27.03 KG/M2 | HEIGHT: 61 IN

## 2019-06-10 DIAGNOSIS — K50.919 CROHN'S DISEASE WITH COMPLICATION, UNSPECIFIED GASTROINTESTINAL TRACT LOCATION: ICD-10-CM

## 2019-06-10 DIAGNOSIS — F41.9 ANXIETY: ICD-10-CM

## 2019-06-10 DIAGNOSIS — J32.9 SINUSITIS, UNSPECIFIED CHRONICITY, UNSPECIFIED LOCATION: Primary | ICD-10-CM

## 2019-06-10 DIAGNOSIS — J02.9 PHARYNGITIS, UNSPECIFIED ETIOLOGY: ICD-10-CM

## 2019-06-10 DIAGNOSIS — R51.9 NONINTRACTABLE HEADACHE, UNSPECIFIED CHRONICITY PATTERN, UNSPECIFIED HEADACHE TYPE: ICD-10-CM

## 2019-06-10 PROCEDURE — 96372 THER/PROPH/DIAG INJ SC/IM: CPT | Mod: S$GLB,,, | Performed by: FAMILY MEDICINE

## 2019-06-10 PROCEDURE — 99999 PR PBB SHADOW E&M-EST. PATIENT-LVL III: CPT | Mod: PBBFAC,,, | Performed by: FAMILY MEDICINE

## 2019-06-10 PROCEDURE — 96372 PR INJECTION,THERAP/PROPH/DIAG2ST, IM OR SUBCUT: ICD-10-PCS | Mod: S$GLB,,, | Performed by: FAMILY MEDICINE

## 2019-06-10 PROCEDURE — 99999 PR PBB SHADOW E&M-EST. PATIENT-LVL III: ICD-10-PCS | Mod: PBBFAC,,, | Performed by: FAMILY MEDICINE

## 2019-06-10 PROCEDURE — 3008F PR BODY MASS INDEX (BMI) DOCUMENTED: ICD-10-PCS | Mod: CPTII,S$GLB,, | Performed by: FAMILY MEDICINE

## 2019-06-10 PROCEDURE — 99213 PR OFFICE/OUTPT VISIT, EST, LEVL III, 20-29 MIN: ICD-10-PCS | Mod: 25,S$GLB,, | Performed by: FAMILY MEDICINE

## 2019-06-10 PROCEDURE — 99213 OFFICE O/P EST LOW 20 MIN: CPT | Mod: 25,S$GLB,, | Performed by: FAMILY MEDICINE

## 2019-06-10 PROCEDURE — 3008F BODY MASS INDEX DOCD: CPT | Mod: CPTII,S$GLB,, | Performed by: FAMILY MEDICINE

## 2019-06-10 RX ORDER — METHYLPREDNISOLONE 4 MG/1
TABLET ORAL
Qty: 1 PACKAGE | Refills: 0 | Status: SHIPPED | OUTPATIENT
Start: 2019-06-10 | End: 2019-08-20 | Stop reason: ALTCHOICE

## 2019-06-10 RX ORDER — TRIAMCINOLONE ACETONIDE 40 MG/ML
40 INJECTION, SUSPENSION INTRA-ARTICULAR; INTRAMUSCULAR ONCE
Status: COMPLETED | OUTPATIENT
Start: 2019-06-10 | End: 2019-06-10

## 2019-06-10 RX ORDER — IBUPROFEN 600 MG/1
TABLET ORAL
Qty: 60 TABLET | Refills: 5 | Status: SHIPPED | OUTPATIENT
Start: 2019-06-10 | End: 2019-10-28

## 2019-06-10 RX ORDER — AZITHROMYCIN 250 MG/1
TABLET, FILM COATED ORAL
Qty: 6 TABLET | Refills: 0 | Status: SHIPPED | OUTPATIENT
Start: 2019-06-10 | End: 2019-06-14

## 2019-06-10 RX ADMIN — TRIAMCINOLONE ACETONIDE 40 MG: 40 INJECTION, SUSPENSION INTRA-ARTICULAR; INTRAMUSCULAR at 02:06

## 2019-06-10 NOTE — PROGRESS NOTES
Subjective:       Patient ID: Sana Braden is a 54 y.o. female.    Chief Complaint: Allergies (x 3 days) and Headache    HPI:  54-year-old female --allergies in headache x3 days--some chills yesterday but no true fever, +runny stuffy nose, slight sore throat, no cough.  No pneumonia asthma TB smoking 3 cigarettes per day.  ROS:  Skin: no psoriasis, eczema, skin cancer  HEENT: + headache--behind the eyes and cervical area  ,+ ocular pain, no  blurred vision, diplopia, epistaxis, hoarseness change in voice, thyroid trouble history of squamous cell carcinoma-skin cancer  Lung: No pneumonia, asthma, Tb, wheezing, SOB, see history of present illness  Heart: No chest pain, ankle edema, palpitations, MI, phu murmur, hypertension, hyperlipidemia no stent bypass arrhythmia  Abdomen: +nausea, no  vomiting, diarrhea, constipation, ulcers, hepatitis, gallbladder disease, melena, hematochezia, hematemesis +history Crohn's disease been doing pretty well  : no UTI, renal disease, stones   GYN hyst, had mammogram few months ago  MS: no fractures, O/A, lupus, rheumatoid, gout  Neuro:+  dizziness, LOC, seizures    No diabetes, no anemia, no anxiety, no depression   Single, no children, work [employer], Lives with sister     Objective:   Physical Exam:  General: Well nourished, well developed, no acute distress  Skin: No lesions  HEENT: Eyes PERRLA, EOM intact, nose clear , throat +1/for erythematous ears TMs clear  NECK: Supple, no bruits, No JVD, no nodes  Lungs: Clear, no rales, rhonchi, wheezing   Heart: Regular rate and rhythm, no murmurs, gallops, or rubs  Abdomen: flat, bowel sounds positive, no tenderness, or organomegaly  MS: Range of motion and muscle strength intact reflexes 2/4  Neuro: Alert, CN intact, oriented X 3 Romberg negative heel-toe intact mild tenderness in the cervical spine with palpation but full range of motion muscle strength Romberg negative heel-toe intact  Extremities: No cyanosis, clubbing, or  edema         Assessment:       1. Sinusitis, unspecified chronicity, unspecified location    2. Pharyngitis, unspecified etiology    3. Nonintractable headache, unspecified chronicity pattern, unspecified headache type    4. Crohn's disease with complication, unspecified gastrointestinal tract location    5. Anxiety        Plan:       Sinusitis, unspecified chronicity, unspecified location    Pharyngitis, unspecified etiology    Nonintractable headache, unspecified chronicity pattern, unspecified headache type    Crohn's disease with complication, unspecified gastrointestinal tract location    Anxiety    Other orders  -     triamcinolone acetonide injection 40 mg  -     azithromycin (Z-INDIA) 250 MG tablet; 2 tabs by mouth day 1, then 1 tab by mouth daily x 4 days  Dispense: 6 tablet; Refill: 0  -     ibuprofen (ADVIL,MOTRIN) 600 MG tablet; After Medrol Dosepak complete start ibuprofen 601 p.o. q.6-8 hours p.r.n. pain no other NSAIDs  Dispense: 60 tablet; Refill: 5  -     methylPREDNISolone (MEDROL DOSEPACK) 4 mg tablet; use as directed  Dispense: 1 Package; Refill: 0      gabapentin helping a lot with restless leg  Headache-sinusitis--Kenalog/Zithromax/Medrol--ibuprofen  Neck pain-cervical strain-Kenalog/Medrol--ibuprofen --will be help with Robaxin as a muscle relaxer--Moist heat theragesic or Tiger balm range of motion exercise of the neck if pain persist will get an x-ray of the cervical spine patient needs to do headache diary--may need MRI of the brain or MRI of the cervical spine if symptoms persist

## 2019-06-10 NOTE — PROGRESS NOTES
Patient ID verified by name and . Allergies reviewed with patient. Kenalog 40 mg IM in right VG using aseptic technique. Aspirated with no blood noted. Patient tolerated well. Given per physicians order. No adverse reaction noted.

## 2019-08-20 ENCOUNTER — OFFICE VISIT (OUTPATIENT)
Dept: PRIMARY CARE CLINIC | Facility: CLINIC | Age: 54
End: 2019-08-20
Payer: COMMERCIAL

## 2019-08-20 VITALS
WEIGHT: 139.13 LBS | TEMPERATURE: 98 F | SYSTOLIC BLOOD PRESSURE: 110 MMHG | BODY MASS INDEX: 26.27 KG/M2 | OXYGEN SATURATION: 97 % | HEART RATE: 76 BPM | RESPIRATION RATE: 14 BRPM | HEIGHT: 61 IN | DIASTOLIC BLOOD PRESSURE: 75 MMHG

## 2019-08-20 DIAGNOSIS — F41.0 PANIC ATTACK: Primary | ICD-10-CM

## 2019-08-20 DIAGNOSIS — R07.89 OTHER CHEST PAIN: ICD-10-CM

## 2019-08-20 PROBLEM — E66.3 OVERWEIGHT: Status: RESOLVED | Noted: 2017-09-21 | Resolved: 2019-08-20

## 2019-08-20 PROBLEM — S39.012A LUMBOSACRAL STRAIN: Status: RESOLVED | Noted: 2017-09-21 | Resolved: 2019-08-20

## 2019-08-20 PROCEDURE — 93005 EKG 12-LEAD: ICD-10-PCS | Mod: S$GLB,,, | Performed by: NURSE PRACTITIONER

## 2019-08-20 PROCEDURE — 3008F PR BODY MASS INDEX (BMI) DOCUMENTED: ICD-10-PCS | Mod: CPTII,S$GLB,, | Performed by: NURSE PRACTITIONER

## 2019-08-20 PROCEDURE — 3008F BODY MASS INDEX DOCD: CPT | Mod: CPTII,S$GLB,, | Performed by: NURSE PRACTITIONER

## 2019-08-20 PROCEDURE — 99214 OFFICE O/P EST MOD 30 MIN: CPT | Mod: S$GLB,,, | Performed by: NURSE PRACTITIONER

## 2019-08-20 PROCEDURE — 99999 PR PBB SHADOW E&M-EST. PATIENT-LVL III: ICD-10-PCS | Mod: PBBFAC,,, | Performed by: NURSE PRACTITIONER

## 2019-08-20 PROCEDURE — 93005 ELECTROCARDIOGRAM TRACING: CPT | Mod: S$GLB,,, | Performed by: NURSE PRACTITIONER

## 2019-08-20 PROCEDURE — 99214 PR OFFICE/OUTPT VISIT, EST, LEVL IV, 30-39 MIN: ICD-10-PCS | Mod: S$GLB,,, | Performed by: NURSE PRACTITIONER

## 2019-08-20 PROCEDURE — 99999 PR PBB SHADOW E&M-EST. PATIENT-LVL III: CPT | Mod: PBBFAC,,, | Performed by: NURSE PRACTITIONER

## 2019-08-20 RX ORDER — CLONAZEPAM 0.5 MG/1
0.5 TABLET ORAL 2 TIMES DAILY PRN
Qty: 30 TABLET | Refills: 0 | Status: SHIPPED | OUTPATIENT
Start: 2019-08-20 | End: 2019-09-10 | Stop reason: SDUPTHER

## 2019-08-20 NOTE — PROGRESS NOTES
Chief Complaint  Chief Complaint   Patient presents with    Anxiety     nervous, overwhelmed, shaky, panic attacks, states it comes and goes        HPI    Sana Braden is a 54 y.o. female that presents for panic attack.    H/o panic attacks in the past. New onset symptoms starting yesterday. Shaky feeling. Feeling overwhelmed. No chest pain, does report chest pressure.  Palpitations.  No previous EKG on file.  Denies shortness of breath or difficulty breathing.  Reports diaphoresis, shakiness. Increased stressors including financial, extra family members at home. Good support system. No h/o depression. No dysphoria. No anhedonia, no withdrawal. Intermittent nausea. Intermittent sleeping at night, trouble staying asleep. Frequent waking.  No SI/HI.     PAST MEDICAL HISTORY:  Past Medical History:   Diagnosis Date    Crohn's disease     Joint pain     Skin cancer     squamous cell on face    Squamous Cell Carcinoma 2010    R nasolabial fold, excised in [place]       PAST SURGICAL HISTORY:  Past Surgical History:   Procedure Laterality Date    ABDOMINAL SURGERY      APPENDECTOMY      COLONOSCOPY N/A 3/18/2013    Performed by Gustavo Daivs MD at [facility] (4TH FLR)    EXCISION, MASS, PELVIS Right 7/29/2013    Performed by Manuel Messer MD at [facility] OR 2ND FLR    HYSTERECTOMY  2007    LAPAROTOMY- EXPLORATORY AND BSO Left 7/29/2013    Performed by Manuel Messer MD at [facility] 2ND FLR    rso      rt rso  2003    large and small bowel resection due to being involved with ov mass.    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       SOCIAL HISTORY:  Social History     Socioeconomic History    Marital status: Single     Spouse name: Not on file    Number of children: Not on file    Years of education: Not on file    Highest education level: Not on file   Occupational History    Occupation: SAles      Employer:  [employer]   Social Needs    Financial resource strain: Not on file    Food insecurity:     Worry: Not on file     Inability: Not on file    Transportation needs:     Medical: Not on file     Non-medical: Not on file   Tobacco Use    Smoking status: Former Smoker     Packs/day: 0.25     Years: 20.00     Pack years: 5.00    Smokeless tobacco: Never Used    Tobacco comment: 2-3 cigg/day   Substance and Sexual Activity    Alcohol use: Yes     Comment: occasionally, very infrequent    Drug use: No    Sexual activity: Yes     Partners: Male     Comment: occ pain with intercourse   Lifestyle    Physical activity:     Days per week: Not on file     Minutes per session: Not on file    Stress: Not on file   Relationships    Social connections:     Talks on phone: Not on file     Gets together: Not on file     Attends Mosque service: Not on file     Active member of club or organization: Not on file     Attends meetings of clubs or organizations: Not on file     Relationship status: Not on file   Other Topics Concern    Are you pregnant or think you may be? No    Breast-feeding No   Social History Narrative    Not on file       FAMILY HISTORY:  Family History   Problem Relation Age of Onset    Heart disease Father     Diabetes Maternal Grandmother     Melanoma Neg Hx     Ovarian cancer Neg Hx     Uterine cancer Neg Hx     Breast cancer Neg Hx     Colon cancer Neg Hx        ALLERGIES AND MEDICATIONS: updated and reviewed.  Review of patient's allergies indicates:   Allergen Reactions    Adhesive Dermatitis     blisters     Current Outpatient Medications   Medication Sig Dispense Refill    gabapentin (NEURONTIN) 300 MG capsule Take 1 capsule (300 mg total) by mouth 3 (three) times daily. 90 capsule 11    ibuprofen (ADVIL,MOTRIN) 600 MG tablet After Medrol Dosepak complete start ibuprofen 601 p.o. q.6-8 hours p.r.n. pain no other NSAIDs 60 tablet 5    methocarbamol (ROBAXIN) 750 MG Tab One  "p.o. q.h.s. p.r.n. muscle spasm if needed can increase to 1 p.o. q.8 hours p.r.n. muscle spasm 40 tablet 5    clonazePAM (KLONOPIN) 0.5 MG tablet Take 1 tablet (0.5 mg total) by mouth 2 (two) times daily as needed for Anxiety. 30 tablet 0     No current facility-administered medications for this visit.          ROS  Review of Systems   Constitutional: Positive for diaphoresis. Negative for chills and fever.   HENT: Negative for ear pain, postnasal drip and sinus pain.    Respiratory: Positive for chest tightness. Negative for cough and shortness of breath.    Cardiovascular: Negative for chest pain.   Gastrointestinal: Positive for diarrhea. Negative for nausea and vomiting.   Neurological: Positive for dizziness and light-headedness.   Psychiatric/Behavioral: Positive for sleep disturbance. Negative for dysphoric mood and suicidal ideas. The patient is nervous/anxious.            PHYSICAL EXAM  Vitals:    08/20/19 0952   BP: 110/75   BP Location: Right arm   Patient Position: Sitting   BP Method: Medium (Automatic)   Pulse: 76   Resp: 14   Temp: 97.7 °F (36.5 °C)   TempSrc: Oral   SpO2: 97%   Weight: 63.1 kg (139 lb 1.6 oz)   Height: 5' 1" (1.549 m)    Body mass index is 26.28 kg/m².  Weight: 63.1 kg (139 lb 1.6 oz)   Height: 5' 1" (154.9 cm)     Physical Exam   Constitutional: She is oriented to person, place, and time. She appears well-developed and well-nourished.   HENT:   Head: Normocephalic.   Right Ear: Tympanic membrane normal.   Left Ear: Tympanic membrane normal.   Mouth/Throat: Uvula is midline, oropharynx is clear and moist and mucous membranes are normal.   Eyes: Conjunctivae are normal.   Cardiovascular: Normal rate, regular rhythm, normal heart sounds and normal pulses.   No murmur heard.  Pulses:       Radial pulses are 2+ on the right side, and 2+ on the left side.   No LE swelling noted   Pulmonary/Chest: Effort normal and breath sounds normal. She has no wheezes.   Abdominal: Soft. Bowel sounds " are normal. There is no tenderness.   Musculoskeletal: She exhibits no edema.   Lymphadenopathy:     She has no cervical adenopathy.   Neurological: She is alert and oriented to person, place, and time.   Skin: Skin is warm and dry. No rash noted.   Psychiatric: She has a normal mood and affect.         Health Maintenance       Date Due Completion Date    Lipid Panel 1967 ---    TETANUS VACCINE 01/23/1985 ---    Shingles Vaccine (1 of 2) 01/23/2017 ---    Influenza Vaccine (1) 09/01/2019 ---    Mammogram 05/20/2021 5/20/2019    Colonoscopy 03/18/2023 3/18/2013            Assessment & Plan    Problem List Items Addressed This Visit     None      Visit Diagnoses     Panic attack    -  Primary    Relevant Medications    clonazePAM (KLONOPIN) 0.5 MG tablet  The instructed patient that benzodiazepines are only to be used as needed for anxiety and panic attacks.  Should not be used on a regular basis.  Please use sparingly.  If she should need a benzodiazepine on a daily basis we should consider use of an SSRI.      Other Relevant Orders    IN OFFICE EKG 12-LEAD (to [facility])    Chest pain        Relevant Orders    IN OFFICE EKG 12-LEAD (to [place])          Follow-up: Follow up if symptoms worsen or fail to improve.    Nayely Rodriguez    Medication List with Changes/Refills   New Medications    CLONAZEPAM (KLONOPIN) 0.5 MG TABLET    Take 1 tablet (0.5 mg total) by mouth 2 (two) times daily as needed for Anxiety.   Current Medications    GABAPENTIN (NEURONTIN) 300 MG CAPSULE    Take 1 capsule (300 mg total) by mouth 3 (three) times daily.    IBUPROFEN (ADVIL,MOTRIN) 600 MG TABLET    After Medrol Dosepak complete start ibuprofen 601 p.o. q.6-8 hours p.r.n. pain no other NSAIDs    METHOCARBAMOL (ROBAXIN) 750 MG TAB    One p.o. q.h.s. p.r.n. muscle spasm if needed can increase to 1 p.o. q.8 hours p.r.n. muscle spasm   Discontinued Medications    METHYLPREDNISOLONE (MEDROL DOSEPACK) 4 MG TABLET    use as directed

## 2019-09-10 DIAGNOSIS — F41.0 PANIC ATTACK: ICD-10-CM

## 2019-09-10 RX ORDER — CLONAZEPAM 0.5 MG/1
0.5 TABLET ORAL DAILY PRN
Qty: 30 TABLET | Refills: 0 | Status: SHIPPED | OUTPATIENT
Start: 2019-09-10 | End: 2020-07-27

## 2019-09-10 RX ORDER — CLONAZEPAM 0.5 MG/1
0.5 TABLET ORAL 2 TIMES DAILY PRN
Qty: 30 TABLET | Refills: 0 | Status: CANCELLED | OUTPATIENT
Start: 2019-09-10 | End: 2020-09-09

## 2019-09-10 NOTE — TELEPHONE ENCOUNTER
----- Message from Evy Jayme sent at 9/10/2019 11:20 AM CDT -----  Contact: Patient  Type:  RX Refill Request    Who Called:  Sana, patient  Refill or New Rx:  Refill  RX Name and Strength:  clonazePAM (KLONOPIN) 0.5 MG tablet  How is the patient currently taking it? (ex. 1XDay):  Take 1 tablet (0.5 mg total) by mouth 2 (two) times daily as needed for Anxiety  Is this a 30 day or 90 day RX:  30  Preferred Pharmacy with phone number:    [id number] - [place] - [address] AT [facility] [address]  [address] [address]  Phone: [phone] Fax: [phone]  Local or Mail Order:  Local  Ordering Provider:  Nayely Rodriguez NP  Best Call Back Number:  [phone]  Additional Information:  Please advise. Thanks.

## 2019-09-10 NOTE — TELEPHONE ENCOUNTER
Please call patient and let her know that this is the medication that should not be taken once or twice daily.  It is only be taken as needed for panic attacks and she should not need a refill more than every 1-2 months.  If she needs a medication to better control her anxiety we should consider starting her on a daily medication regimen so she does not need quite so much Klonopin.

## 2019-09-26 NOTE — TELEPHONE ENCOUNTER
Spoke with patient, scheduled her an appointment to come in for a refill. Patient requests to see Nayely Rodriguez. Scheduled her an appointment on Tuesday at 9:40am

## 2019-10-01 ENCOUNTER — OFFICE VISIT (OUTPATIENT)
Dept: PRIMARY CARE CLINIC | Facility: CLINIC | Age: 54
End: 2019-10-01
Payer: COMMERCIAL

## 2019-10-01 VITALS
RESPIRATION RATE: 18 BRPM | BODY MASS INDEX: 25.28 KG/M2 | OXYGEN SATURATION: 98 % | HEIGHT: 61 IN | DIASTOLIC BLOOD PRESSURE: 78 MMHG | SYSTOLIC BLOOD PRESSURE: 112 MMHG | WEIGHT: 133.88 LBS | HEART RATE: 58 BPM | TEMPERATURE: 98 F

## 2019-10-01 DIAGNOSIS — F41.9 ANXIETY: Primary | ICD-10-CM

## 2019-10-01 PROCEDURE — 99214 PR OFFICE/OUTPT VISIT, EST, LEVL IV, 30-39 MIN: ICD-10-PCS | Mod: S$GLB,,, | Performed by: NURSE PRACTITIONER

## 2019-10-01 PROCEDURE — 99214 OFFICE O/P EST MOD 30 MIN: CPT | Mod: S$GLB,,, | Performed by: NURSE PRACTITIONER

## 2019-10-01 PROCEDURE — 99999 PR PBB SHADOW E&M-EST. PATIENT-LVL III: CPT | Mod: PBBFAC,,, | Performed by: NURSE PRACTITIONER

## 2019-10-01 PROCEDURE — 99999 PR PBB SHADOW E&M-EST. PATIENT-LVL III: ICD-10-PCS | Mod: PBBFAC,,, | Performed by: NURSE PRACTITIONER

## 2019-10-01 PROCEDURE — 3008F PR BODY MASS INDEX (BMI) DOCUMENTED: ICD-10-PCS | Mod: CPTII,S$GLB,, | Performed by: NURSE PRACTITIONER

## 2019-10-01 PROCEDURE — 3008F BODY MASS INDEX DOCD: CPT | Mod: CPTII,S$GLB,, | Performed by: NURSE PRACTITIONER

## 2019-10-01 RX ORDER — ONDANSETRON 8 MG/1
8 TABLET, ORALLY DISINTEGRATING ORAL EVERY 6 HOURS PRN
Qty: 15 TABLET | Refills: 0 | Status: SHIPPED | OUTPATIENT
Start: 2019-10-01 | End: 2019-10-28

## 2019-10-01 RX ORDER — CLONAZEPAM 0.5 MG/1
0.5 TABLET ORAL 2 TIMES DAILY PRN
Qty: 30 TABLET | Refills: 1 | Status: SHIPPED | OUTPATIENT
Start: 2019-10-01 | End: 2019-10-28 | Stop reason: SDUPTHER

## 2019-10-01 NOTE — PROGRESS NOTES
Chief Complaint  Chief Complaint   Patient presents with    Follow-up     anxiety       HPI    Sana Braden is a 54 y.o. female that presents for anxiety.    Patient with history of anxiety reports increased stressors previously seen for panic attacks, insomnia.  Patient previously started on klonopin for anxiety on 8/2. Overall improvement in symptoms on medication. Improvement in panic attacks. Sleeping well. No dysphoria. No anhedonia. No SI/HI.  Taking Klonopin every day to every other day.   checked.  Feeling regularly.  Requesting refill on klonopin at this time.  Due for lab work at next visit.          PAST MEDICAL HISTORY:  Past Medical History:   Diagnosis Date    Crohn's disease     Joint pain     Skin cancer     squamous cell on face    Squamous Cell Carcinoma 2010    R nasolabial fold, excised in [place]       PAST SURGICAL HISTORY:  Past Surgical History:   Procedure Laterality Date    ABDOMINAL SURGERY      APPENDECTOMY      HYSTERECTOMY  2007    rso      rt rso  2003    large and small bowel resection due to being involved with ov mass.    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       SOCIAL HISTORY:  Social History     Socioeconomic History    Marital status: Single     Spouse name: Not on file    Number of children: Not on file    Years of education: Not on file    Highest education level: Not on file   Occupational History    Occupation: SAles      Employer: [employer]   Social Needs    Financial resource strain: Not on file    Food insecurity:     Worry: Not on file     Inability: Not on file    Transportation needs:     Medical: Not on file     Non-medical: Not on file   Tobacco Use    Smoking status: Former Smoker     Packs/day: 0.25     Years: 20.00     Pack years: 5.00    Smokeless tobacco: Never Used    Tobacco comment: 2-3 cigg/day   Substance and Sexual Activity     Alcohol use: Yes     Comment: occasionally, very infrequent    Drug use: No    Sexual activity: Yes     Partners: Male     Comment: occ pain with intercourse   Lifestyle    Physical activity:     Days per week: Not on file     Minutes per session: Not on file    Stress: Not on file   Relationships    Social connections:     Talks on phone: Not on file     Gets together: Not on file     Attends Jew service: Not on file     Active member of club or organization: Not on file     Attends meetings of clubs or organizations: Not on file     Relationship status: Not on file   Other Topics Concern    Are you pregnant or think you may be? No    Breast-feeding No   Social History Narrative    Not on file       FAMILY HISTORY:  Family History   Problem Relation Age of Onset    Heart disease Father     Diabetes Maternal Grandmother     Melanoma Neg Hx     Ovarian cancer Neg Hx     Uterine cancer Neg Hx     Breast cancer Neg Hx     Colon cancer Neg Hx        ALLERGIES AND MEDICATIONS: updated and reviewed.  Review of patient's allergies indicates:   Allergen Reactions    Adhesive Dermatitis     blisters     Current Outpatient Medications   Medication Sig Dispense Refill    clonazePAM (KLONOPIN) 0.5 MG tablet Take 1 tablet (0.5 mg total) by mouth daily as needed for Anxiety. 30 tablet 0    gabapentin (NEURONTIN) 300 MG capsule Take 1 capsule (300 mg total) by mouth 3 (three) times daily. 90 capsule 11    clonazePAM (KLONOPIN) 0.5 MG tablet Take 1 tablet (0.5 mg total) by mouth 2 (two) times daily as needed for Anxiety. 30 tablet 1    ibuprofen (ADVIL,MOTRIN) 600 MG tablet After Medrol Dosepak complete start ibuprofen 601 p.o. q.6-8 hours p.r.n. pain no other NSAIDs 60 tablet 5    methocarbamol (ROBAXIN) 750 MG Tab One p.o. q.h.s. p.r.n. muscle spasm if needed can increase to 1 p.o. q.8 hours p.r.n. muscle spasm 40 tablet 5    ondansetron (ZOFRAN-ODT) 8 MG TbDL Take 1 tablet (8 mg total) by mouth every  "6 (six) hours as needed. 15 tablet 0     No current facility-administered medications for this visit.          ROS  Review of Systems   Constitutional: Negative for chills and fever.   HENT: Negative for ear pain, postnasal drip and sinus pain.    Respiratory: Negative for cough and shortness of breath.    Cardiovascular: Negative for chest pain.   Gastrointestinal: Negative for diarrhea, nausea and vomiting.   Psychiatric/Behavioral: Negative for dysphoric mood and sleep disturbance. The patient is nervous/anxious.            PHYSICAL EXAM  Vitals:    10/01/19 0953   BP: 112/78   BP Location: Right arm   Patient Position: Sitting   BP Method: Medium (Automatic)   Pulse: (!) 58   Resp: 18   Temp: 98.1 °F (36.7 °C)   TempSrc: Oral   SpO2: 98%   Weight: 60.7 kg (133 lb 14.4 oz)   Height: 5' 1" (1.549 m)    Body mass index is 25.3 kg/m².  Weight: 60.7 kg (133 lb 14.4 oz)   Height: 5' 1" (154.9 cm)     Physical Exam   Constitutional: She is oriented to person, place, and time. She appears well-developed and well-nourished.   HENT:   Head: Normocephalic.   Right Ear: Tympanic membrane normal.   Left Ear: Tympanic membrane normal.   Mouth/Throat: Uvula is midline, oropharynx is clear and moist and mucous membranes are normal.   Eyes: Conjunctivae are normal.   Cardiovascular: Normal rate, regular rhythm, normal heart sounds and normal pulses.   No murmur heard.  Pulses:       Radial pulses are 2+ on the right side, and 2+ on the left side.   No LE swelling noted   Pulmonary/Chest: Effort normal and breath sounds normal. She has no wheezes.   Abdominal: Soft. Bowel sounds are normal. There is no tenderness.   Musculoskeletal: She exhibits no edema.   Lymphadenopathy:     She has no cervical adenopathy.   Neurological: She is alert and oriented to person, place, and time.   Skin: Skin is warm and dry. No rash noted.   Psychiatric: She has a normal mood and affect.         Health Maintenance       Date Due Completion Date    " Lipid Panel 1967 ---    TETANUS VACCINE 01/23/1985 ---    Shingles Vaccine (1 of 2) 01/23/2017 ---    Influenza Vaccine (1) 09/01/2019 ---    Mammogram 05/20/2021 5/20/2019    Colonoscopy 03/18/2023 3/18/2013            Assessment & Plan    Problem List Items Addressed This Visit        Unprioritized    Anxiety - Primary    Relevant Medications    clonazePAM (KLONOPIN) 0.5 MG tablet  Reinforced to patient discomfort with her taking Klonopin regularly.  Should her anxiety symptoms continue on a daily basis we need to reconsider an SSRI.          Follow-up: No follow-ups on file.    Nayely Rodriguez    Medication List with Changes/Refills   New Medications    CLONAZEPAM (KLONOPIN) 0.5 MG TABLET    Take 1 tablet (0.5 mg total) by mouth 2 (two) times daily as needed for Anxiety.    ONDANSETRON (ZOFRAN-ODT) 8 MG TBDL    Take 1 tablet (8 mg total) by mouth every 6 (six) hours as needed.   Current Medications    CLONAZEPAM (KLONOPIN) 0.5 MG TABLET    Take 1 tablet (0.5 mg total) by mouth daily as needed for Anxiety.    GABAPENTIN (NEURONTIN) 300 MG CAPSULE    Take 1 capsule (300 mg total) by mouth 3 (three) times daily.    IBUPROFEN (ADVIL,MOTRIN) 600 MG TABLET    After Medrol Dosepak complete start ibuprofen 601 p.o. q.6-8 hours p.r.n. pain no other NSAIDs    METHOCARBAMOL (ROBAXIN) 750 MG TAB    One p.o. q.h.s. p.r.n. muscle spasm if needed can increase to 1 p.o. q.8 hours p.r.n. muscle spasm

## 2019-10-11 ENCOUNTER — OFFICE VISIT (OUTPATIENT)
Dept: PRIMARY CARE CLINIC | Facility: CLINIC | Age: 54
End: 2019-10-11
Payer: COMMERCIAL

## 2019-10-11 VITALS
OXYGEN SATURATION: 98 % | WEIGHT: 134 LBS | SYSTOLIC BLOOD PRESSURE: 120 MMHG | TEMPERATURE: 98 F | RESPIRATION RATE: 17 BRPM | HEART RATE: 53 BPM | DIASTOLIC BLOOD PRESSURE: 72 MMHG | BODY MASS INDEX: 25.3 KG/M2 | HEIGHT: 61 IN

## 2019-10-11 DIAGNOSIS — N95.1 MENOPAUSAL SYNDROME: ICD-10-CM

## 2019-10-11 DIAGNOSIS — J40 BRONCHITIS: ICD-10-CM

## 2019-10-11 DIAGNOSIS — K50.919 CROHN'S DISEASE WITH COMPLICATION, UNSPECIFIED GASTROINTESTINAL TRACT LOCATION: ICD-10-CM

## 2019-10-11 DIAGNOSIS — J98.01 BRONCHOSPASM: ICD-10-CM

## 2019-10-11 DIAGNOSIS — J32.9 SINUSITIS, UNSPECIFIED CHRONICITY, UNSPECIFIED LOCATION: Primary | ICD-10-CM

## 2019-10-11 PROCEDURE — 3008F BODY MASS INDEX DOCD: CPT | Mod: CPTII,S$GLB,, | Performed by: FAMILY MEDICINE

## 2019-10-11 PROCEDURE — 96372 THER/PROPH/DIAG INJ SC/IM: CPT | Mod: S$GLB,,, | Performed by: FAMILY MEDICINE

## 2019-10-11 PROCEDURE — 99213 PR OFFICE/OUTPT VISIT, EST, LEVL III, 20-29 MIN: ICD-10-PCS | Mod: 25,S$GLB,, | Performed by: FAMILY MEDICINE

## 2019-10-11 PROCEDURE — 3008F PR BODY MASS INDEX (BMI) DOCUMENTED: ICD-10-PCS | Mod: CPTII,S$GLB,, | Performed by: FAMILY MEDICINE

## 2019-10-11 PROCEDURE — 99213 OFFICE O/P EST LOW 20 MIN: CPT | Mod: 25,S$GLB,, | Performed by: FAMILY MEDICINE

## 2019-10-11 PROCEDURE — 99999 PR PBB SHADOW E&M-EST. PATIENT-LVL III: CPT | Mod: PBBFAC,,, | Performed by: FAMILY MEDICINE

## 2019-10-11 PROCEDURE — 96372 PR INJECTION,THERAP/PROPH/DIAG2ST, IM OR SUBCUT: ICD-10-PCS | Mod: S$GLB,,, | Performed by: FAMILY MEDICINE

## 2019-10-11 PROCEDURE — 99999 PR PBB SHADOW E&M-EST. PATIENT-LVL III: ICD-10-PCS | Mod: PBBFAC,,, | Performed by: FAMILY MEDICINE

## 2019-10-11 RX ORDER — TRIAMCINOLONE ACETONIDE 40 MG/ML
40 INJECTION, SUSPENSION INTRA-ARTICULAR; INTRAMUSCULAR ONCE
Status: COMPLETED | OUTPATIENT
Start: 2019-10-11 | End: 2019-10-11

## 2019-10-11 RX ORDER — METHYLPREDNISOLONE 4 MG/1
TABLET ORAL
Qty: 1 PACKAGE | Refills: 0 | Status: SHIPPED | OUTPATIENT
Start: 2019-10-11 | End: 2019-10-28

## 2019-10-11 RX ORDER — PROMETHAZINE HYDROCHLORIDE AND CODEINE PHOSPHATE 6.25; 1 MG/5ML; MG/5ML
5 SOLUTION ORAL EVERY 6 HOURS PRN
Qty: 180 ML | Refills: 0 | Status: SHIPPED | OUTPATIENT
Start: 2019-10-11 | End: 2019-10-28

## 2019-10-11 RX ORDER — LEVOFLOXACIN 500 MG/1
500 TABLET, FILM COATED ORAL DAILY
Qty: 10 TABLET | Refills: 0 | Status: SHIPPED | OUTPATIENT
Start: 2019-10-11 | End: 2019-10-21

## 2019-10-11 RX ORDER — ALBUTEROL SULFATE 90 UG/1
2 AEROSOL, METERED RESPIRATORY (INHALATION) EVERY 4 HOURS PRN
Qty: 1 INHALER | Refills: 5 | Status: SHIPPED | OUTPATIENT
Start: 2019-10-11 | End: 2019-10-28

## 2019-10-11 RX ADMIN — TRIAMCINOLONE ACETONIDE 40 MG: 40 INJECTION, SUSPENSION INTRA-ARTICULAR; INTRAMUSCULAR at 01:10

## 2019-10-11 NOTE — PROGRESS NOTES
Pt ID verified using name and .Allergies verified.Kenolog 40 mg given as ordered Im using aseptic technique. Pt tolerated well.

## 2019-10-11 NOTE — PROGRESS NOTES
Subjective:       Patient ID: Sana Braden is a 54 y.o. female.    Chief Complaint: Cough (congestion) and Fever    HPI:  54-year-old white female in for cold x1 week--+subjective fever over the weekend, +runny nose, +swollen 3 weeks, +cough, +phlegm--.yellow no pneumonia asthma TB, no smoking  Last menstrual period-hysterectomy     ROS:  Skin: no psoriasis, eczema, skin cancer  HEENT: + headache-frontal area   ,no  ocular pain, no  blurred vision, diplopia, epistaxis, hoarseness change in voice, thyroid trouble +glass  Lung: No pneumonia, asthma, Tb, wheezing, SOB  Heart: No chest pain, ankle edema, palpitations, MI, phu murmur, hypertension, hyperlipidemia no stent bypass arrhythmia  Abdomen: +nausea, no  vomiting, diarrhea, constipation, ulcers, hepatitis, gallbladder disease, melena, hematochezia, hematemesis +history Crohn's disease been doing pretty well  : no UTI, renal disease, stones   GYN hyst, had mammogram few months ago  MS: no fractures, O/A, lupus, rheumatoid, gout  Neuro:no   dizziness, LOC, seizures    No diabetes, no anemia, no anxiety, no depression   Single, no children, work [employer], Lives with sister     Objective:   Physical Exam:  General: Well nourished, well developed, no acute distress  Skin: No lesions  HEENT: Eyes PERRLA, EOM intact, nose clear discharge , throat +1/4 erythematous ears TMs clear  NECK: Supple, no bruits, No JVD, no nodes  Lungs: Clear, no rales,  +coarse cough +rhonchi +rare wheeze  Heart: Regular rate and rhythm, no murmurs, gallops, or rubs  Abdomen: flat, bowel sounds positive, no tenderness, or organomegaly  MS: Range of motion and muscle strength intact reflexes 2/4  Neuro: Alert, CN intact, oriented X 3 Romberg negative heel-toe intact  Extremities: No cyanosis, clubbing, or edema         Assessment:       1. Sinusitis, unspecified chronicity, unspecified location    2. Bronchitis    3. Bronchospasm    4. Crohn's disease with complication, unspecified  gastrointestinal tract location    5. Menopausal syndrome        Plan:       Sinusitis, unspecified chronicity, unspecified location    Bronchitis    Bronchospasm    Crohn's disease with complication, unspecified gastrointestinal tract location    Menopausal syndrome    Other orders  -     triamcinolone acetonide injection 40 mg  -     levoFLOXacin (LEVAQUIN) 500 MG tablet; Take 1 tablet (500 mg total) by mouth once daily. for 10 days  Dispense: 10 tablet; Refill: 0  -     methylPREDNISolone (MEDROL DOSEPACK) 4 mg tablet; use as directed  Dispense: 1 Package; Refill: 0  -     promethazine-codeine 6.25-10 mg/5 ml (PHENERGAN WITH CODEINE) 6.25-10 mg/5 mL syrup; Take 5 mLs by mouth every 6 (six) hours as needed for Cough.  Dispense: 180 mL; Refill: 0  -     albuterol (PROVENTIL/VENTOLIN HFA) 90 mcg/actuation inhaler; Inhale 2 puffs into the lungs every 4 (four) hours as needed for Wheezing or Shortness of Breath. Rescue  Dispense: 1 Inhaler; Refill: 5      cold--Kenalog/Levaquin/Medrol/Phenergan with codeine/--patient given prescription for albuterol inhaler wheezing persists after 24 hr of steroids fill albuterol 2 puffs q.6 hours p.r.n. wheeze or cough  Lab appears to be due CBCs CMP lipid January  If symptoms persist patient could get a chest x-ray Monday  Health maintenance lipids tetanus flu shot patient's lab should be done in 3 months could get flu shot tetanus shot in 2 weeks if still due

## 2019-10-28 ENCOUNTER — OFFICE VISIT (OUTPATIENT)
Dept: PRIMARY CARE CLINIC | Facility: CLINIC | Age: 54
End: 2019-10-28
Payer: COMMERCIAL

## 2019-10-28 VITALS
BODY MASS INDEX: 25.34 KG/M2 | HEART RATE: 72 BPM | SYSTOLIC BLOOD PRESSURE: 108 MMHG | HEIGHT: 61 IN | RESPIRATION RATE: 18 BRPM | WEIGHT: 134.19 LBS | TEMPERATURE: 98 F | OXYGEN SATURATION: 98 % | DIASTOLIC BLOOD PRESSURE: 74 MMHG

## 2019-10-28 DIAGNOSIS — J06.9 UPPER RESPIRATORY TRACT INFECTION, UNSPECIFIED TYPE: Primary | ICD-10-CM

## 2019-10-28 DIAGNOSIS — F41.9 ANXIETY: ICD-10-CM

## 2019-10-28 PROCEDURE — 99999 PR PBB SHADOW E&M-EST. PATIENT-LVL III: CPT | Mod: PBBFAC,,, | Performed by: FAMILY MEDICINE

## 2019-10-28 PROCEDURE — 99214 PR OFFICE/OUTPT VISIT, EST, LEVL IV, 30-39 MIN: ICD-10-PCS | Mod: S$GLB,,, | Performed by: FAMILY MEDICINE

## 2019-10-28 PROCEDURE — 99214 OFFICE O/P EST MOD 30 MIN: CPT | Mod: S$GLB,,, | Performed by: FAMILY MEDICINE

## 2019-10-28 PROCEDURE — 3008F BODY MASS INDEX DOCD: CPT | Mod: CPTII,S$GLB,, | Performed by: FAMILY MEDICINE

## 2019-10-28 PROCEDURE — 3008F PR BODY MASS INDEX (BMI) DOCUMENTED: ICD-10-PCS | Mod: CPTII,S$GLB,, | Performed by: FAMILY MEDICINE

## 2019-10-28 PROCEDURE — 99999 PR PBB SHADOW E&M-EST. PATIENT-LVL III: ICD-10-PCS | Mod: PBBFAC,,, | Performed by: FAMILY MEDICINE

## 2019-10-28 RX ORDER — SERTRALINE HYDROCHLORIDE 50 MG/1
50 TABLET, FILM COATED ORAL NIGHTLY
Qty: 30 TABLET | Refills: 2 | Status: SHIPPED | OUTPATIENT
Start: 2019-10-28 | End: 2019-12-16

## 2019-10-28 RX ORDER — LORATADINE 10 MG/1
10 TABLET ORAL DAILY
COMMUNITY
End: 2020-07-27

## 2019-10-28 NOTE — PROGRESS NOTES
"Subjective:       Patient ID: Sana Braden is a 54 y.o. female.    Chief Complaint: Chest Congestion and Headache    Feeling better today but ups and downs with recent cold. Coughing at night and congestion. No blood. No N/V. Ok appetite. Subjective fever.     Review of Systems   Constitutional: Negative for activity change, chills and fever.   Respiratory: Negative for cough, chest tightness, shortness of breath and wheezing.    Cardiovascular: Negative for chest pain, palpitations and leg swelling.   Gastrointestinal: Negative for abdominal distention, abdominal pain, constipation, diarrhea, nausea and vomiting.   Genitourinary: Negative for difficulty urinating and dysuria.   Skin: Negative for rash.   Neurological: Negative for weakness.   Hematological: Does not bruise/bleed easily.   Psychiatric/Behavioral: Negative for agitation. The patient is not nervous/anxious.        Objective:      Vitals:    10/28/19 1612   BP: 108/74   BP Location: Left arm   Patient Position: Sitting   BP Method: Medium (Manual)   Pulse: 72   Resp: 18   Temp: 98.2 °F (36.8 °C)   TempSrc: Oral   SpO2: 98%   Weight: 60.9 kg (134 lb 3.2 oz)   Height: 5' 1" (1.549 m)     Physical Exam   Constitutional: She appears well-developed and well-nourished.   HENT:   Head: Normocephalic and atraumatic.   Nose: Nose normal.   Mouth/Throat: Oropharynx is clear and moist.   Eyes: Conjunctivae and EOM are normal.   Cardiovascular: Normal rate, regular rhythm and normal heart sounds.   Pulmonary/Chest: Effort normal and breath sounds normal. No respiratory distress. She has no wheezes. She has no rales.   Abdominal: Soft. She exhibits no distension. There is no tenderness.   Lymphadenopathy:     She has no cervical adenopathy.   Neurological: She is alert. No cranial nerve deficit.   Psychiatric: Her mood appears anxious. Her affect is labile. She does not exhibit a depressed mood.   Nursing note and vitals reviewed.          Lab Results   Component " Value Date     (L) 01/17/2019    K 3.9 01/17/2019    CL 99 (L) 01/17/2019    CO2 26 01/17/2019    BUN 6 01/17/2019    CREATININE 0.8 01/17/2019    ANIONGAP 9 01/17/2019     No results found for: HGBA1C  No results found for: BNP, BNPTRIAGEBLO    Lab Results   Component Value Date    WBC 6.00 01/17/2019    HGB 13.6 01/17/2019    HCT 40.7 01/17/2019     01/17/2019    GRAN 4.4 01/17/2019    GRAN 73.4 (H) 01/17/2019     No results found for: CHOL, HDL, LDLCALC, TRIG       Current Outpatient Medications:     clonazePAM (KLONOPIN) 0.5 MG tablet, Take 1 tablet (0.5 mg total) by mouth daily as needed for Anxiety., Disp: 30 tablet, Rfl: 0    loratadine (CLARITIN) 10 mg tablet, Take 10 mg by mouth once daily., Disp: , Rfl:     sertraline (ZOLOFT) 50 MG tablet, Take 1 tablet (50 mg total) by mouth every evening., Disp: 30 tablet, Rfl: 2        Assessment:       1. Upper respiratory tract infection, unspecified type    2. Anxiety           Plan:       Upper respiratory tract infection, unspecified type  -Congestion, dry cough, and sore throat suspect viral URI. Encouraged warm fluids, rest. RTC if does not improve in the next week or worsens. Normal exam and do not suspect lung involvement currently.   Anxiety  -Longstanding but significantly worse around this time of year with anniversary of traumatic event. Discussed clonazepam vs SSRIs as pt does not anticipate needing it long term and safety of immediate discontinuation safer with low does SSRI.   -     sertraline (ZOLOFT) 50 MG tablet; Take 1 tablet (50 mg total) by mouth every evening.  Dispense: 30 tablet; Refill: 2

## 2019-12-16 ENCOUNTER — TELEPHONE (OUTPATIENT)
Dept: PRIMARY CARE CLINIC | Facility: CLINIC | Age: 54
End: 2019-12-16

## 2019-12-16 DIAGNOSIS — F41.9 ANXIETY: Primary | ICD-10-CM

## 2019-12-16 RX ORDER — SERTRALINE HYDROCHLORIDE 100 MG/1
100 TABLET, FILM COATED ORAL DAILY
Qty: 30 TABLET | Refills: 2 | Status: SHIPPED | OUTPATIENT
Start: 2019-12-16 | End: 2020-03-15

## 2019-12-16 NOTE — TELEPHONE ENCOUNTER
Phoned pt to clarify. Feels medication had desired effect when she first started it, but feels it is not working as well now, Is currently taking 50mg zoloft and feels medication needs to be increased to a higher dose.  Informed patient she may need to schedule a follow up appointment with the physician to further discuss medication but MD will be notified of her request and I will call her back to let her know what she needs to do. Pharmacy verified.

## 2019-12-16 NOTE — TELEPHONE ENCOUNTER
----- Message from Evy Delacruz sent at 12/16/2019  9:09 AM CST -----  Contact: Patient  Type: Needs Medical Advice    Who Called:  Kathryn, patient  Symptoms (please be specific):  N/A  How long has patient had these symptoms:  N/A  Pharmacy name and phone #:    [id number] - [place], [place] - [address]  [address] [place] [address]  Phone: [phone] Fax: [phone]  Best Call Back Number: [phone]  Additional Information: Calling to ask if the dosage can be increased for Rx sertraline (ZOLOFT) 50 MG tablet. Please advise. Thanks.

## 2019-12-16 NOTE — TELEPHONE ENCOUNTER
Pt requesting increasing dosing of zoloft as current dose of 50 mg is only minimally helpful. More at first and less now. Plan to double dose to 100 mg. She is asked to contact provider if this needs further adjustment or follow up with her PCP.

## 2020-01-18 DIAGNOSIS — F41.9 ANXIETY: ICD-10-CM

## 2020-01-22 RX ORDER — SERTRALINE HYDROCHLORIDE 50 MG/1
50 TABLET, FILM COATED ORAL NIGHTLY
Qty: 90 TABLET | Refills: 0 | OUTPATIENT
Start: 2020-01-22 | End: 2021-01-21

## 2020-03-14 DIAGNOSIS — F41.9 ANXIETY: ICD-10-CM

## 2020-03-15 RX ORDER — SERTRALINE HYDROCHLORIDE 100 MG/1
TABLET, FILM COATED ORAL
Qty: 90 TABLET | Refills: 0 | Status: SHIPPED | OUTPATIENT
Start: 2020-03-15 | End: 2020-03-17 | Stop reason: SDUPTHER

## 2020-03-17 DIAGNOSIS — F41.9 ANXIETY: ICD-10-CM

## 2020-03-17 RX ORDER — SERTRALINE HYDROCHLORIDE 100 MG/1
100 TABLET, FILM COATED ORAL DAILY
Qty: 90 TABLET | Refills: 0 | Status: SHIPPED | OUTPATIENT
Start: 2020-03-17 | End: 2020-06-15

## 2020-03-24 ENCOUNTER — PATIENT OUTREACH (OUTPATIENT)
Dept: ADMINISTRATIVE | Facility: OTHER | Age: 55
End: 2020-03-24

## 2020-05-19 ENCOUNTER — PATIENT OUTREACH (OUTPATIENT)
Dept: ADMINISTRATIVE | Facility: OTHER | Age: 55
End: 2020-05-19

## 2020-05-27 ENCOUNTER — PATIENT OUTREACH (OUTPATIENT)
Dept: ADMINISTRATIVE | Facility: OTHER | Age: 55
End: 2020-05-27

## 2020-06-02 ENCOUNTER — PATIENT OUTREACH (OUTPATIENT)
Dept: ADMINISTRATIVE | Facility: OTHER | Age: 55
End: 2020-06-02

## 2020-06-02 RX ORDER — GABAPENTIN 300 MG/1
CAPSULE ORAL
Qty: 270 CAPSULE | Refills: 3 | OUTPATIENT
Start: 2020-06-02

## 2020-06-02 NOTE — PROGRESS NOTES
Patient's chart was reviewed.   Requested updates within Care Everywhere.  Immunizations were not able to be reconciled. Patient was not found in LINKS.

## 2020-06-03 NOTE — TELEPHONE ENCOUNTER
Call tell pt needs lab q year last lab June 2018 Needs CBC,CMP,liopd T4,TSh U/A stool guaiac if over 2-3yrs Chest Xray EKG Neerds come innfasting get lab get seen get additional refills Gabapentin not refillsed over the phone so come in fasting gwt lab get seen get additionalo refills

## 2020-06-11 ENCOUNTER — PATIENT OUTREACH (OUTPATIENT)
Dept: ADMINISTRATIVE | Facility: OTHER | Age: 55
End: 2020-06-11

## 2020-06-25 ENCOUNTER — TELEPHONE (OUTPATIENT)
Dept: PRIMARY CARE CLINIC | Facility: CLINIC | Age: 55
End: 2020-06-25

## 2020-06-25 DIAGNOSIS — Z12.31 SCREENING MAMMOGRAM, ENCOUNTER FOR: Primary | ICD-10-CM

## 2020-06-25 NOTE — TELEPHONE ENCOUNTER
----- Message from Radha Staley sent at 6/25/2020  9:19 AM CDT -----  Regarding: [facility] Mammogram  The following patient received a mammogram between the dates March 19, 2018, and March 24, 2020, at [facility]. According to the FDA, the following patient may need to be re-evaluated by their PCP and considered for a repeat mammogram test.     Please review the following patient's chart. If you feel that your patient will need a repeat mammogram, please place this order into Epic. Once the order is placed, I will follow up with your patient to schedule in a timely manner.     Please notify the following patient if a repeat mammogram is not warranted.      Thank you,     Radha Staley, Access Navigator  [phone]

## 2020-07-02 DIAGNOSIS — Z12.39 BREAST CANCER SCREENING: ICD-10-CM

## 2020-07-15 ENCOUNTER — HOSPITAL ENCOUNTER (OUTPATIENT)
Dept: RADIOLOGY | Facility: HOSPITAL | Age: 55
Discharge: HOME | End: 2020-07-15
Attending: FAMILY MEDICINE
Payer: COMMERCIAL

## 2020-07-15 DIAGNOSIS — Z12.31 SCREENING MAMMOGRAM, ENCOUNTER FOR: ICD-10-CM

## 2020-07-15 PROCEDURE — 77063 BREAST TOMOSYNTHESIS BI: CPT | Mod: 26,,, | Performed by: RADIOLOGY

## 2020-07-15 PROCEDURE — 77067 SCR MAMMO BI INCL CAD: CPT | Mod: TC

## 2020-07-15 PROCEDURE — 77063 MAMMO DIGITAL SCREENING BILAT WITH TOMOSYNTHESIS_CAD: ICD-10-PCS | Mod: 26,,, | Performed by: RADIOLOGY

## 2020-07-15 PROCEDURE — 77067 SCR MAMMO BI INCL CAD: CPT | Mod: 26,,, | Performed by: RADIOLOGY

## 2020-07-15 PROCEDURE — 77067 MAMMO DIGITAL SCREENING BILAT WITH TOMOSYNTHESIS_CAD: ICD-10-PCS | Mod: 26,,, | Performed by: RADIOLOGY

## 2020-07-15 NOTE — TELEPHONE ENCOUNTER
----- Message from Alejandra Cheng sent at 7/15/2020  9:52 AM CDT -----  Contact: Patient [phone]  Prescription Request:     Name of medication: ondansetron (ZOFRAN-ODT) 4 MG TbDL     Reason for request: Refill    Pharmacy: [id number] - [place], [place] - [address]    Please advise.    Thank You

## 2020-07-16 RX ORDER — ONDANSETRON 4 MG/1
4 TABLET, ORALLY DISINTEGRATING ORAL EVERY 6 HOURS PRN
Qty: 30 TABLET | Refills: 5 | Status: SHIPPED | OUTPATIENT
Start: 2020-07-16 | End: 2020-07-27

## 2020-07-27 ENCOUNTER — OFFICE VISIT (OUTPATIENT)
Dept: PRIMARY CARE CLINIC | Facility: CLINIC | Age: 55
End: 2020-07-27
Payer: COMMERCIAL

## 2020-07-27 VITALS
RESPIRATION RATE: 16 BRPM | HEART RATE: 65 BPM | WEIGHT: 133.81 LBS | TEMPERATURE: 98 F | BODY MASS INDEX: 25.27 KG/M2 | HEIGHT: 61 IN | DIASTOLIC BLOOD PRESSURE: 68 MMHG | OXYGEN SATURATION: 99 % | SYSTOLIC BLOOD PRESSURE: 106 MMHG

## 2020-07-27 DIAGNOSIS — H10.023 PINK EYE DISEASE OF BOTH EYES: Primary | ICD-10-CM

## 2020-07-27 PROCEDURE — 99213 OFFICE O/P EST LOW 20 MIN: CPT | Mod: S$GLB,,, | Performed by: FAMILY MEDICINE

## 2020-07-27 PROCEDURE — 3008F PR BODY MASS INDEX (BMI) DOCUMENTED: ICD-10-PCS | Mod: CPTII,S$GLB,, | Performed by: FAMILY MEDICINE

## 2020-07-27 PROCEDURE — 3008F BODY MASS INDEX DOCD: CPT | Mod: CPTII,S$GLB,, | Performed by: FAMILY MEDICINE

## 2020-07-27 PROCEDURE — 99999 PR PBB SHADOW E&M-EST. PATIENT-LVL III: ICD-10-PCS | Mod: PBBFAC,,, | Performed by: FAMILY MEDICINE

## 2020-07-27 PROCEDURE — 99213 PR OFFICE/OUTPT VISIT, EST, LEVL III, 20-29 MIN: ICD-10-PCS | Mod: S$GLB,,, | Performed by: FAMILY MEDICINE

## 2020-07-27 PROCEDURE — 99999 PR PBB SHADOW E&M-EST. PATIENT-LVL III: CPT | Mod: PBBFAC,,, | Performed by: FAMILY MEDICINE

## 2020-07-27 RX ORDER — POLYMYXIN B SULFATE AND TRIMETHOPRIM 1; 10000 MG/ML; [USP'U]/ML
1 SOLUTION OPHTHALMIC EVERY 6 HOURS
Qty: 10 ML | Refills: 0 | Status: SHIPPED | OUTPATIENT
Start: 2020-07-27 | End: 2020-08-01

## 2020-07-27 RX ORDER — GABAPENTIN 100 MG/1
100 CAPSULE ORAL 3 TIMES DAILY
COMMUNITY
End: 2020-09-01 | Stop reason: SDUPTHER

## 2020-07-27 NOTE — PROGRESS NOTES
"Subjective:       Patient ID: Sana Braden is a 55 y.o. female.    Chief Complaint: Conjunctivitis (OS red, irritated, gunky this am, headache)    Complains of right then left eye irritation beginning over the past 24 hours or more. Some eye drainage in the am. No fevers. Itchy eyes with some cloudiness in the left eye she attributes to eye goo. Feels like a "stick in the eye". No pain with eye movement. No other symptoms. Washes her hands regularly so unsure of how she got it.    Review of Systems   Constitutional: Negative for activity change, chills and fever.   HENT: Negative for congestion.    Respiratory: Negative for cough, chest tightness and shortness of breath.    Cardiovascular: Negative for chest pain and leg swelling.   Gastrointestinal: Negative for abdominal distention, abdominal pain, constipation, diarrhea, nausea and vomiting.       Objective:      Vitals:    07/27/20 1419   BP: 106/68   BP Location: Right arm   Patient Position: Sitting   BP Method: Medium (Manual)   Pulse: 65   Resp: 16   Temp: 97.8 °F (36.6 °C)   TempSrc: Oral   SpO2: 99%   Weight: 60.7 kg (133 lb 13.1 oz)   Height: 5' 1" (1.549 m)     Physical Exam  Vitals signs and nursing note reviewed.   Constitutional:       Appearance: She is well-developed.   HENT:      Head: Normocephalic and atraumatic.      Nose: Nose normal.   Eyes:      General: No scleral icterus.     Extraocular Movements: Extraocular movements intact.      Conjunctiva/sclera: Conjunctivae normal.      Pupils: Pupils are equal, round, and reactive to light.      Comments: bl eye injection. L>R. No discharge   Pulmonary:      Effort: Pulmonary effort is normal. No respiratory distress.   Neurological:      Mental Status: She is alert.      Cranial Nerves: No cranial nerve deficit.             Lab Results   Component Value Date     (L) 01/17/2019    K 3.9 01/17/2019    CL 99 (L) 01/17/2019    CO2 26 01/17/2019    BUN 6 01/17/2019    CREATININE 0.8 01/17/2019 "    ANIONGAP 9 01/17/2019     No results found for: HGBA1C  No results found for: BNP, BNPTRIAGEBLO    Lab Results   Component Value Date    WBC 6.00 01/17/2019    HGB 13.6 01/17/2019    HCT 40.7 01/17/2019     01/17/2019    GRAN 4.4 01/17/2019    GRAN 73.4 (H) 01/17/2019     No results found for: CHOL, HDL, LDLCALC, TRIG       Current Outpatient Medications:     gabapentin (NEURONTIN) 100 MG capsule, Take 100 mg by mouth 3 (three) times daily., Disp: , Rfl:     sertraline (ZOLOFT) 100 MG tablet, TAKE 1 TABLET BY MOUTH EVERY DAY, Disp: 90 tablet, Rfl: 2    polymyxin B sulf-trimethoprim (POLYTRIM) 10,000 unit- 1 mg/mL Drop, Place 1 drop into both eyes every 6 (six) hours. for 5 days, Disp: 10 mL, Rfl: 0        Assessment:       1. Pink eye disease of both eyes           Plan:       Pink eye disease of both eyes  -     polymyxin B sulf-trimethoprim (POLYTRIM) 10,000 unit- 1 mg/mL Drop; Place 1 drop into both eyes every 6 (six) hours. for 5 days  Dispense: 10 mL; Refill: 0     suspect viral but prescribing antibiotic if warm compresses and antihistamine does not improve symptoms beyond the next 48 hours. To RTC asap if eye pain/vision changes ensues.

## 2020-08-02 ENCOUNTER — PATIENT OUTREACH (OUTPATIENT)
Dept: ADMINISTRATIVE | Facility: OTHER | Age: 55
End: 2020-08-02

## 2020-08-03 NOTE — PROGRESS NOTES
Patient's chart was reviewed for overdue MIYA ( Proactive [facility] Encounters) Topics  Requested updates within Care Everywhere.  Patient was not found in LINKS.   Health Maintenance was updated.

## 2020-08-13 ENCOUNTER — PATIENT OUTREACH (OUTPATIENT)
Dept: ADMINISTRATIVE | Facility: OTHER | Age: 55
End: 2020-08-13

## 2020-08-13 NOTE — PROGRESS NOTES
Chart was reviewed for overdue [facility]active [facility] Encounters (MIYA)  topics  Updates were requested from care everywhere  Health Maintenance has been updated  LINKS immunization registry triggered/ patient not found on LINKS

## 2020-08-20 ENCOUNTER — CLINICAL SUPPORT (OUTPATIENT)
Dept: PRIMARY CARE CLINIC | Facility: CLINIC | Age: 55
End: 2020-08-20
Payer: COMMERCIAL

## 2020-08-20 ENCOUNTER — OFFICE VISIT (OUTPATIENT)
Dept: PRIMARY CARE CLINIC | Facility: CLINIC | Age: 55
End: 2020-08-20
Payer: COMMERCIAL

## 2020-08-20 VITALS
WEIGHT: 131.5 LBS | RESPIRATION RATE: 17 BRPM | OXYGEN SATURATION: 97 % | HEART RATE: 76 BPM | BODY MASS INDEX: 24.83 KG/M2 | SYSTOLIC BLOOD PRESSURE: 100 MMHG | DIASTOLIC BLOOD PRESSURE: 70 MMHG | HEIGHT: 61 IN | TEMPERATURE: 99 F

## 2020-08-20 DIAGNOSIS — F41.9 ANXIETY: ICD-10-CM

## 2020-08-20 DIAGNOSIS — K50.919 CROHN'S DISEASE WITH COMPLICATION, UNSPECIFIED GASTROINTESTINAL TRACT LOCATION: ICD-10-CM

## 2020-08-20 DIAGNOSIS — Z11.4 ENCOUNTER FOR SCREENING FOR HIV: ICD-10-CM

## 2020-08-20 DIAGNOSIS — N39.0 RECURRENT UTI: Primary | ICD-10-CM

## 2020-08-20 DIAGNOSIS — R10.30 LOWER ABDOMINAL PAIN: ICD-10-CM

## 2020-08-20 DIAGNOSIS — N95.1 MENOPAUSAL SYNDROME: ICD-10-CM

## 2020-08-20 DIAGNOSIS — Z11.59 NEED FOR HEPATITIS C SCREENING TEST: ICD-10-CM

## 2020-08-20 LAB
BILIRUB SERPL-MCNC: ABNORMAL MG/DL
BLOOD URINE, POC: ABNORMAL
CLARITY, POC UA: CLEAR
COLOR, POC UA: YELLOW
GLUCOSE UR QL STRIP: ABNORMAL
KETONES UR QL STRIP: ABNORMAL
LEUKOCYTE ESTERASE URINE, POC: ABNORMAL
NITRITE, POC UA: ABNORMAL
PH, POC UA: 5
PROTEIN, POC: ABNORMAL
SPECIFIC GRAVITY, POC UA: 1.02
UROBILINOGEN, POC UA: ABNORMAL

## 2020-08-20 PROCEDURE — 81002 URINALYSIS NONAUTO W/O SCOPE: CPT | Mod: S$GLB,,, | Performed by: FAMILY MEDICINE

## 2020-08-20 PROCEDURE — 99999 PR PBB SHADOW E&M-EST. PATIENT-LVL IV: ICD-10-PCS | Mod: PBBFAC,,, | Performed by: FAMILY MEDICINE

## 2020-08-20 PROCEDURE — 81002 POCT URINE DIPSTICK WITHOUT MICROSCOPE: ICD-10-PCS | Mod: S$GLB,,, | Performed by: FAMILY MEDICINE

## 2020-08-20 PROCEDURE — 99214 OFFICE O/P EST MOD 30 MIN: CPT | Mod: S$GLB,25,, | Performed by: FAMILY MEDICINE

## 2020-08-20 PROCEDURE — 99999 PR PBB SHADOW E&M-EST. PATIENT-LVL IV: CPT | Mod: PBBFAC,,, | Performed by: FAMILY MEDICINE

## 2020-08-20 PROCEDURE — 87086 URINE CULTURE/COLONY COUNT: CPT

## 2020-08-20 PROCEDURE — 3008F BODY MASS INDEX DOCD: CPT | Mod: CPTII,S$GLB,, | Performed by: FAMILY MEDICINE

## 2020-08-20 PROCEDURE — 99214 PR OFFICE/OUTPT VISIT, EST, LEVL IV, 30-39 MIN: ICD-10-PCS | Mod: S$GLB,25,, | Performed by: FAMILY MEDICINE

## 2020-08-20 PROCEDURE — 3008F PR BODY MASS INDEX (BMI) DOCUMENTED: ICD-10-PCS | Mod: CPTII,S$GLB,, | Performed by: FAMILY MEDICINE

## 2020-08-20 RX ORDER — NITROFURANTOIN 25; 75 MG/1; MG/1
100 CAPSULE ORAL 2 TIMES DAILY
Qty: 14 CAPSULE | Refills: 0 | Status: SHIPPED | OUTPATIENT
Start: 2020-08-20 | End: 2020-08-20

## 2020-08-20 RX ORDER — CIPROFLOXACIN 500 MG/1
500 TABLET ORAL 2 TIMES DAILY
Qty: 20 TABLET | Refills: 0 | Status: SHIPPED | OUTPATIENT
Start: 2020-08-20 | End: 2020-09-08

## 2020-08-20 RX ORDER — ONDANSETRON 4 MG/1
8 TABLET, FILM COATED ORAL 2 TIMES DAILY
COMMUNITY
End: 2020-08-28 | Stop reason: SDUPTHER

## 2020-08-20 RX ORDER — PHENAZOPYRIDINE HYDROCHLORIDE 200 MG/1
200 TABLET, FILM COATED ORAL 3 TIMES DAILY PRN
Qty: 15 TABLET | Refills: 0 | Status: SHIPPED | OUTPATIENT
Start: 2020-08-20 | End: 2020-08-30

## 2020-08-20 NOTE — PROGRESS NOTES
Subjective:       Patient ID: Sana Braden is a 55 y.o. female.    Chief Complaint: Nausea and Back Pain    HPI:  55-year-old white female started approximately 10 days ago---abdominal pain in the suprapubic and lower quadrants bilaterally----pressure sensation-burning some pain in the lower back---nausea--no frequency---+urgency---+retention--insides burn each time patient urinates--no hematuria pyuria or odor.  No history of kidney stones or renal disease       History of Crohn's disease no recent flare--nausea--last week patient vomited x2--no constipation or diarrhea---no ulcers hepatitis gallbladder disease---no melena hematochezia hematemesis       Over the past year patient thinks may have had 6 urinary tract infection--came here for antibiotics twice     ROS:  Skin: no psoriasis, eczema, skin cancer  HEENT:  No headache   ,no  ocular pain, no  blurred vision, diplopia, epistaxis, hoarseness change in voice, thyroid trouble +glass  Lung: No pneumonia, asthma, Tb, wheezing, SOB  Heart: No chest pain, ankle edema, palpitations, MI, phu murmur, hypertension, hyperlipidemia no stent bypass arrhythmia  Abdomen: +nausea, +  vomiting, +abdominal pain see history of present illness suprapubic and lower quadrants bilaterally--no  diarrhea, constipation, ulcers, hepatitis, gallbladder disease, melena, hematochezia, hematemesis +history Crohn's disease been doing pretty well  : no UTI, renal disease, stones +abdominal pain see symptoms UTI above  GYN hyst, had mammogram few months ago no vaginal discharge  MS: no fractures, O/A, lupus, rheumatoid, gout  Neuro:no   dizziness, LOC, seizures    No diabetes, no anemia, no anxiety, no depression   Single, no children, work [employer], Lives with sister     Objective:   Physical Exam:  General: Well nourished, well developed, no acute distress  Skin: No lesions  HEENT: Eyes PERRLA, EOM intact, nose clear discharge , throat +1/4 erythematous ears TMs clear  NECK:  Supple, no bruits, No JVD, no nodes  Lungs: Clear, no rales,  +coarse cough +rhonchi +rare wheeze  Heart: Regular rate and rhythm, no murmurs, gallops, or rubs  Abdomen: flat, bowel sounds positive, no tenderness, or organomegaly  MS: Range of motion and muscle strength intact reflexes 2/4  Neuro: Alert, CN intact, oriented X 3 Romberg negative heel-toe intact  Extremities: No cyanosis, clubbing, or edema         Assessment:       1. Recurrent UTI    2. Lower abdominal pain    3. Menopausal syndrome    4. Anxiety    5. Crohn's disease with complication, unspecified gastrointestinal tract location    6. Encounter for screening for HIV    7. Need for hepatitis C screening test        Plan:       Recurrent UTI  -     Urine culture    Lower abdominal pain  -     CBC auto differential; Future; Expected date: 08/20/2020  -     Comprehensive metabolic panel; Future; Expected date: 08/20/2020  -     Fecal Immunochemical Test (iFOBT); Future; Expected date: 08/20/2020  -     Lipid Panel; Future; Expected date: 08/20/2020  -     POCT urine dipstick without microscope  -     TSH; Future; Expected date: 08/20/2020  -     T4, free; Future; Expected date: 08/20/2020  -     Cancel: CT Abdomen Pelvis W Wo Contrast; Future; Expected date: 08/20/2020  -     Ambulatory referral/consult to Urology; Future; Expected date: 08/27/2020    Menopausal syndrome    Anxiety    Crohn's disease with complication, unspecified gastrointestinal tract location    Encounter for screening for HIV  -     HIV 1/2 Ag/Ab (4th Gen); Future; Expected date: 08/20/2020    Need for hepatitis C screening test  -     Hepatitis C Antibody; Future; Expected date: 08/20/2020    Other orders  -     Discontinue: nitrofurantoin, macrocrystal-monohydrate, (MACROBID) 100 MG capsule; Take 1 capsule (100 mg total) by mouth 2 (two) times daily.  Dispense: 14 capsule; Refill: 0  -     phenazopyridine (PYRIDIUM) 200 MG tablet; Take 1 tablet (200 mg total) by mouth 3 (three)  times daily as needed.  Dispense: 15 tablet; Refill: 0  -     ciprofloxacin HCl (CIPRO) 500 MG tablet; Take 1 tablet (500 mg total) by mouth 2 (two) times daily. for 19 days  Dispense: 20 tablet; Refill: 0        History recurrent UTI--patient states had 6 UTIs in the past year--was seen here x2 in treated with antibiotics--last treated 1 month ago--Macrobid 100 b.i.d. x7 days peridium 2 t.i.d. x5 days--midstream clean-catch UA CNS---CT scan abdomen and pelvis with without oral contrast due to history of Crohn's  History of abdominal ultrasound showing mild hydronephrosis on the left disease/recurrent UTI/lower abdominal pain suprapubic and lower quad---Ethan Guerra--?  Cystoscopy --rule out interstitial cystitis .  History of abdominal ultrasound showing left hydronephrosis suggest CT scan never done urine some red cells Cipro 500 b.i.d. times 10 days peridium 200 mg t.i.d. x5  History Crohn's disease states no symptoms at this time 1 episode of nausea vomiting last week  Menopausal syndrome  History of anxiety  Routine lab CBCs CMP lipids T4 TSH stool guaiac UA  As physical Health maintenance hepatitis C lipid HIV tetanus colorectal screen shin  Patient may need GI workup due to history of Crohn's disease with colonoscopy but appears to be more GI source at this time wait for CT scan report

## 2020-08-21 LAB — BACTERIA UR CULT: NO GROWTH

## 2020-08-26 NOTE — PROGRESS NOTES
"Subjective:      Sana Braden is a 55 y.o. female who was referred by Dr. Kelton Vallecillo for evaluation of lower abd pain.      Patient reports multiple UTIs within the past year.  One culture noted from 08/20/2020; negative for infection.  Patient is currently taking Flagyl and Cipro for possible exacerbation of Crohn's.  She reports dysuria despite antibiotics.  She denies hematuria, urgency, frequency.  She denies fever, chills, nausea/vomiting.  She denies flank pain and history of renal stones.  She does not believe that infections/bladder pain is related to intercourse.  No recent changes in detergents, soaps, bathing habits.     The following portions of the patient's history were reviewed and updated as appropriate: allergies, current medications, past family history, past medical history, past social history, past surgical history and problem list.    Review of Systems  Constitutional: no fever or chills  ENT: no nasal congestion or sore throat  Respiratory: no cough or shortness of breath  Cardiovascular: no chest pain or palpitations  Gastrointestinal: no nausea or vomiting, tolerating diet  Genitourinary: as per HPI  Hematologic/Lymphatic: no easy bruising or lymphadenopathy  Musculoskeletal: no arthralgias or myalgias  Neurological: no seizures or tremors  Behavioral/Psych: no auditory or visual hallucinations     Objective:   Vital Signs:/64 (BP Location: Left arm, Patient Position: Sitting, BP Method: Medium (Automatic))   Pulse 75   Temp 97.9 °F (36.6 °C)   Ht 5' 1" (1.549 m)   Wt 59.4 kg (130 lb 13.5 oz)   LMP  (LMP Unknown)   BMI 24.72 kg/m²     Physical Exam   General: alert and oriented, no acute distress  Head: normocephalic, atraumatic  Neck: supple, no lymphadenopathy, normal ROM, no masses  Respiratory: Symmetric expansion, non-labored breathing  Cardiovascular: regular rate and rhythm, nomal pulses, no peripheral edema  Abdomen: soft, non tender,   Pelvic: defer  Lymphatic: no " inguinal nodes  Skin: normal coloration and turgor, no rashes, no suspicious skin lesions noted  Neuro: alert and oriented x3, no gross deficits  Psych: normal judgment and insight, normal mood/affect and non-anxious  Mild CVA tenderness left.    Lab Review   Urinalysis demonstrates positive for leukocytes  Lab Results   Component Value Date    WBC 6.00 01/17/2019    HGB 13.6 01/17/2019    HCT 40.7 01/17/2019    MCV 89 01/17/2019     01/17/2019     Lab Results   Component Value Date    CREATININE 0.8 01/17/2019    BUN 6 01/17/2019       Imaging   CT ABDOMEN PELVIS WITH CONTRAST     CLINICAL HISTORY:  Abdominal pain suprapubic and lower abdominal areas bilaterally recurrent UTI history of Crohn's; Lower abdominal pain, unspecified     TECHNIQUE:  Axial images of the abdomen and pelvis were acquired  after the use of 75 cc [id number] IV contrast. No oral contrast.  Coronal and sagittal reconstructions were also obtained     COMPARISON:  CT abdomen pelvis from 08/22/2016     FINDINGS:  Heart: Normal in size. No pericardial effusion. No significant calcific coronary atherosclerosis.     Lungs: Visualized portions of the lungs are clear.     Liver: Normal in size and contour.  Multiple scattered hypodensities within the liver consistent with cysts or too small to characterize, unchanged when compared to prior exam.  No new focal hepatic lesions.     Gallbladder: No calcified gallstones.     Bile Ducts: No evidence of dilated ducts.     Pancreas: No mass or peripancreatic fat stranding.     Spleen: Unremarkable.     Stomach and duodenum: Unremarkable.     Adrenals: Unremarkable.     Kidneys/ Ureters: Normal in size and location. Normal enhancement.  Bilateral extrarenal pelvises.  No hydronephrosis or nephrolithiasis. No ureteral dilatation.     Bladder: No evidence of wall thickening.     Reproductive organs: Uterus is surgically absent.  No evidence of adnexal mass.     Bowel/Mesentery: Diffuse bowel wall thickening  of the left small bowel which may be seen with enteritis.  No evidence of obstruction.  Postsurgical changes of the right colon.  Remaining portion of the colon demonstrates diffuse bowel wall thickening of the sigmoid colon favored to represent under distension.  Colitis is considered less likely.     Lymph nodes: No lymphadenapathy.     Vasculature: No aneurysm. No significant calcific atherosclerosis.     Abdominal wall:  Unremarkable.     Bones: No acute fracture. No suspicious osseous lesions.     Impression:     Multiple loops of small bowel demonstrates diffuse wall thickening which may be secondary to enteritis.  No evidence of small-bowel obstruction.     Diffuse bowel wall thickening of the colon likely due to under distension.  Colitis is considered less likely.     Multiple hepatic hypodensities consistent with cysts or too small to characterize.  No new hepatic lesions.        Electronically signed by: Eulalio Garcia MD  Date:                                            08/24/2020  Time:                                           13:06    Bladder Scan PVR: 162cc patient instructed to void fully for repeat PVR by me: 0cc     Assessment:     1. History of UTI    2. 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    --Pt will follow up with GI for possible exacerbation of Crohn's  --RTC for new or worsening symptoms

## 2020-08-27 ENCOUNTER — TELEPHONE (OUTPATIENT)
Dept: PRIMARY CARE CLINIC | Facility: CLINIC | Age: 55
End: 2020-08-27

## 2020-08-27 NOTE — TELEPHONE ENCOUNTER
----- Message from Kelton Vallecillo MD sent at 8/24/2020  9:30 PM CDT -----  Call pt tell Multiple loops of small bowel --difuse wall thickening may be secondary to enteritis Diffuse wall thickening colon no colitis and some small hepatic cyst -- swe if pt took any antibiotics Se4e if has abdominal pain tell me Jeanna need Cipro 500 mg bid x and flagyl 500 bid x 7 days Let me know if she is having pain or not before AB

## 2020-08-28 ENCOUNTER — OFFICE VISIT (OUTPATIENT)
Dept: PRIMARY CARE CLINIC | Facility: CLINIC | Age: 55
End: 2020-08-28
Payer: COMMERCIAL

## 2020-08-28 ENCOUNTER — TELEPHONE (OUTPATIENT)
Dept: PRIMARY CARE CLINIC | Facility: CLINIC | Age: 55
End: 2020-08-28

## 2020-08-28 DIAGNOSIS — N39.0 RECURRENT UTI: ICD-10-CM

## 2020-08-28 DIAGNOSIS — K50.919 CROHN'S DISEASE WITH COMPLICATION, UNSPECIFIED GASTROINTESTINAL TRACT LOCATION: ICD-10-CM

## 2020-08-28 DIAGNOSIS — N95.1 MENOPAUSAL SYNDROME: ICD-10-CM

## 2020-08-28 DIAGNOSIS — R10.13 ABDOMINAL PAIN, EPIGASTRIC: Primary | ICD-10-CM

## 2020-08-28 DIAGNOSIS — R11.0 NAUSEA: ICD-10-CM

## 2020-08-28 DIAGNOSIS — K52.9 ENTERITIS: ICD-10-CM

## 2020-08-28 DIAGNOSIS — F41.9 ANXIETY: ICD-10-CM

## 2020-08-28 DIAGNOSIS — K29.70 GASTRITIS, PRESENCE OF BLEEDING UNSPECIFIED, UNSPECIFIED CHRONICITY, UNSPECIFIED GASTRITIS TYPE: ICD-10-CM

## 2020-08-28 PROCEDURE — 99214 PR OFFICE/OUTPT VISIT, EST, LEVL IV, 30-39 MIN: ICD-10-PCS | Mod: 95,,, | Performed by: FAMILY MEDICINE

## 2020-08-28 PROCEDURE — 99214 OFFICE O/P EST MOD 30 MIN: CPT | Mod: 95,,, | Performed by: FAMILY MEDICINE

## 2020-08-28 RX ORDER — ONDANSETRON 4 MG/1
8 TABLET, FILM COATED ORAL EVERY 6 HOURS PRN
Qty: 30 TABLET | Refills: 2 | Status: SHIPPED | OUTPATIENT
Start: 2020-08-28 | End: 2021-06-25

## 2020-08-28 RX ORDER — OMEPRAZOLE 40 MG/1
40 CAPSULE, DELAYED RELEASE ORAL DAILY
Qty: 30 CAPSULE | Refills: 5 | Status: SHIPPED | OUTPATIENT
Start: 2020-08-28 | End: 2021-01-13

## 2020-08-28 RX ORDER — TRAMADOL HYDROCHLORIDE 50 MG/1
50 TABLET ORAL EVERY 6 HOURS PRN
Qty: 30 TABLET | Refills: 0 | Status: SHIPPED | OUTPATIENT
Start: 2020-08-28 | End: 2020-09-07

## 2020-08-28 NOTE — PROGRESS NOTES
Subjective:       Patient ID: Sana Braden is a 55 y.o. female.    Chief Complaint: No chief complaint on file.    HPI:  55-year-old female call in for results CT scan of the abdomen impression multiple loops of small bowel demonstrating diffuse wall thickening which may be secondary to enteritis, diffuse bowel wall thickening of the colon due to under distention colitis is considered less likely multiple hepatic hypodensities consistent with cyst too small to be characterized no new hepatic lesions--patient with history of Crohn's disease--should see GI MD .      Yesterday--burning pain after having a bowel movement--in the upper abdomen.  No nausea vomiting diarrhea last few days.  Yesterday with abdominal pain had some back pain in felt bloated.        --yesterday felt like bladder but not the colon.  Patient to see urologist on Monday--history of several UTIs recently Has appt 3 days Monday      Office visit 08/20/2020 55-year-old white female started approximately 10 days ago---abdominal pain in the suprapubic and lower quadrants bilaterally----pressure sensation-burning some pain in the lower back---nausea--no frequency---+urgency---+retention--insides burn each time patient urinates--no hematuria pyuria or odor.  No history of kidney stones or renal disease       History of Crohn's disease no recent flare--nausea--last week patient vomited x2--no constipation or diarrhea---no ulcers hepatitis gallbladder disease---no melena hematochezia hematemesis       Over the past year patient thinks may have had 6 urinary tract infection--came here for antibiotics twice     ROS:  Skin: no psoriasis, eczema, skin cancer  HEENT:  No headache   ,no  ocular pain, no  blurred vision, diplopia, epistaxis, hoarseness change in voice, thyroid trouble +glass  Lung: No pneumonia, asthma, Tb, wheezing, SOB  Heart: No chest pain, ankle edema, palpitations, MI, phu murmur, hypertension, hyperlipidemia no stent bypass  arrhythmia  Abdomen: +occas nausea, +occas   vomiting, +abdominal pain more in the epigastric area and mid back--no  diarrhea, constipation, ulcers, hepatitis, gallbladder disease, melena, hematochezia, hematemesis +history Crohn's disease been doing pretty well  : no UTI, renal disease, stones +abdominal pain see symptoms UTI above  GYN hyst, had mammogram few months ago no vaginal discharge  MS: no fractures, O/A, lupus, rheumatoid, gout  Neuro:no   dizziness, LOC, seizures    No diabetes, no anemia, no anxiety, no depression   Single, no children, work [employer], Lives with sister     Objective:   Physical Exam:  No physical exam was done this was a phone call visit physical below was from a prior office visit  General: Well nourished, well developed, no acute distress  Skin: No lesions  HEENT: Eyes PERRLA, EOM intact, nose clear discharge , throat +1/4 erythematous ears TMs clear  NECK: Supple, no bruits, No JVD, no nodes  Lungs: Clear, no rales,  +coarse cough +rhonchi +rare wheeze  Heart: Regular rate and rhythm, no murmurs, gallops, or rubs  Abdomen: flat, bowel sounds positive, no tenderness, or organomegaly  MS: Range of motion and muscle strength intact reflexes 2/4  Neuro: Alert, CN intact, oriented X 3 Romberg negative heel-toe intact  Extremities: No cyanosis, clubbing, or edema         Assessment:       1. Abdominal pain, epigastric    2. Nausea    3. Gastritis, presence of bleeding unspecified, unspecified chronicity, unspecified gastritis type    4. Crohn's disease with complication, unspecified gastrointestinal tract location    5. Menopausal syndrome    6. Anxiety    7. Enteritis    8. Recurrent UTI        Plan:       Abdominal pain, epigastric    Nausea    Gastritis, presence of bleeding unspecified, unspecified chronicity, unspecified gastritis type    Crohn's disease with complication, unspecified gastrointestinal tract location  -     Ambulatory referral/consult to Gastroenterology; Future;  Expected date: 09/04/2020    Menopausal syndrome    Anxiety    Enteritis    Recurrent UTI        Call in mainly for results of CT scan---bowel wall thickening of the small it is test in suggesting enteritis/bowel wall thickening of the colon but does not feel has colitis/hepatics cyst--history of Crohn's disease---due to the inflammation of the small bowel--epigastric pain back pain--will treat with omeprazole referred to Dr. Null evaluate Crohn's disease see if patient needs treatment with steroids or azulfadine --or if you feels in needs any additional workup to see if patient is having a Crohn's flare  Recurrent UTI--has appointment urologist Monday--may benefit from cystoscopy to rule out interstitial cystitis  Gastritis will treat with omeprazole  Nausea treat with Zofran  Recently treated with Cipro and peridium for UTI  Lab patient needs to do lab as in computer CBCs CMP stool guaiac thyroid hepatitis C HIV  Health maintenance hepatitis C lipid HIV tetanus colorectal screen shingles  Established Patient - Audio Only Telehealth Visit     The patient location is:  Home  The chief complaint leading to consultation is:  Recurrent UTIs/abdominal pain with gastritis/enteritis/history Crohn  Visit type: Virtual visit with audio only (telephone)  Total time spent with patient:  25 min       The reason for the audio only service rather than synchronous audio and video virtual visit was related to technical difficulties or patient preference/necessity.     Each patient to whom I provide medical services by telemedicine is:  (1) informed of the relationship between the physician and patient and the respective role of any other health care provider with respect to management of the patient; and (2) notified that they may decline to receive medical services by telemedicine and may withdraw from such care at any time. Patient verbally consented to receive this service via voice-only telephone call.       HPI:  See history  of present illness above     Assessment and plan:  See plan above                        This service was not originating from a related E/M service provided within the previous 7 days nor will  to an E/M service or procedure within the next 24 hours or my soonest available appointment.  Prevailing standard of care was able to be met in this audio-only visit.

## 2020-08-31 ENCOUNTER — OFFICE VISIT (OUTPATIENT)
Dept: UROLOGY | Facility: CLINIC | Age: 55
End: 2020-08-31
Payer: COMMERCIAL

## 2020-08-31 VITALS
WEIGHT: 130.81 LBS | HEART RATE: 75 BPM | SYSTOLIC BLOOD PRESSURE: 116 MMHG | DIASTOLIC BLOOD PRESSURE: 64 MMHG | BODY MASS INDEX: 24.7 KG/M2 | HEIGHT: 61 IN | TEMPERATURE: 98 F

## 2020-08-31 DIAGNOSIS — Z87.440 HISTORY OF UTI: Primary | ICD-10-CM

## 2020-08-31 DIAGNOSIS — R10.30 LOWER ABDOMINAL PAIN: ICD-10-CM

## 2020-08-31 LAB
BILIRUB SERPL-MCNC: ABNORMAL MG/DL
BLOOD URINE, POC: ABNORMAL
CLARITY, POC UA: CLEAR
COLOR, POC UA: YELLOW
GLUCOSE UR QL STRIP: NORMAL
KETONES UR QL STRIP: ABNORMAL
LEUKOCYTE ESTERASE URINE, POC: ABNORMAL
NITRITE, POC UA: ABNORMAL
PH, POC UA: 5
POC RESIDUAL URINE VOLUME: 0 ML (ref 0–100)
PROTEIN, POC: ABNORMAL
SPECIFIC GRAVITY, POC UA: 1.01
UROBILINOGEN, POC UA: NORMAL

## 2020-08-31 PROCEDURE — 51798 POCT BLADDER SCAN: ICD-10-PCS | Mod: S$GLB,,, | Performed by: NURSE PRACTITIONER

## 2020-08-31 PROCEDURE — 81002 URINALYSIS NONAUTO W/O SCOPE: CPT | Mod: S$GLB,,, | Performed by: NURSE PRACTITIONER

## 2020-08-31 PROCEDURE — 3008F PR BODY MASS INDEX (BMI) DOCUMENTED: ICD-10-PCS | Mod: CPTII,S$GLB,, | Performed by: NURSE PRACTITIONER

## 2020-08-31 PROCEDURE — 99203 PR OFFICE/OUTPT VISIT, NEW, LEVL III, 30-44 MIN: ICD-10-PCS | Mod: 25,S$GLB,, | Performed by: NURSE PRACTITIONER

## 2020-08-31 PROCEDURE — 99203 OFFICE O/P NEW LOW 30 MIN: CPT | Mod: 25,S$GLB,, | Performed by: NURSE PRACTITIONER

## 2020-08-31 PROCEDURE — 51798 US URINE CAPACITY MEASURE: CPT | Mod: S$GLB,,, | Performed by: NURSE PRACTITIONER

## 2020-08-31 PROCEDURE — 99999 PR PBB SHADOW E&M-EST. PATIENT-LVL V: CPT | Mod: PBBFAC,,, | Performed by: NURSE PRACTITIONER

## 2020-08-31 PROCEDURE — 3008F BODY MASS INDEX DOCD: CPT | Mod: CPTII,S$GLB,, | Performed by: NURSE PRACTITIONER

## 2020-08-31 PROCEDURE — 99999 PR PBB SHADOW E&M-EST. PATIENT-LVL V: ICD-10-PCS | Mod: PBBFAC,,, | Performed by: NURSE PRACTITIONER

## 2020-08-31 PROCEDURE — 81002 POCT URINE DIPSTICK WITHOUT MICROSCOPE: ICD-10-PCS | Mod: S$GLB,,, | Performed by: NURSE PRACTITIONER

## 2020-08-31 RX ORDER — NITROFURANTOIN 25; 75 MG/1; MG/1
100 CAPSULE ORAL 2 TIMES DAILY
COMMUNITY
Start: 2020-08-20 | End: 2021-01-13

## 2020-08-31 RX ORDER — METHENAMINE, SODIUM PHOSPHATE, MONOBASIC, MONOHYDRATE, PHENYL SALICYLATE, METHYLENE BLUE, AND HYOSCYAMINE SULFATE 118; 40.8; 36; 10; .12 MG/1; MG/1; MG/1; MG/1; MG/1
1 CAPSULE ORAL 4 TIMES DAILY PRN
Qty: 40 CAPSULE | Refills: 1 | Status: SHIPPED | OUTPATIENT
Start: 2020-08-31 | End: 2021-01-13

## 2020-08-31 NOTE — LETTER
August 31, 2020      Kelton Vallecillo MD  [address] [place] [address]           [facility]  [address], [facility] [address]  [place] [address]  Phone: [phone]  Fax: [phone]          Patient: Sana Braden   MR Number: [id number]   YOB: 1965   Date of Visit: 8/31/2020       Dear Dr. Kelton Vallecillo:    Thank you for referring Sana Braden to me for evaluation. Attached you will find relevant portions of my assessment and plan of care.    If you have questions, please do not hesitate to call me. I look forward to following Sana Braden along with you.    Sincerely,    Homa Kumar, JOANN    Enclosure  CC:  No Recipients    If you would like to receive this communication electronically, please contact [email or web address] or [phone] to request more information on [employer] Link access.    For providers and/or their staff who would like to refer a patient to [facility], please contact us through our one-stop-shop provider referral line, [facility], [place], at [phone].    If you feel you have received this communication in error or would no longer like to receive these types of communications, please e-mail [email or web address]

## 2020-08-31 NOTE — PATIENT INSTRUCTIONS
Treating Interstitial Cystitis: Lifestyle Changes    Interstitial cystitis (IC) is a type of bladder problem that causes the bladder wall to be tender and easily irritated. This can cause pain and other uncomfortable symptoms. Interstitial cystitis can be treated in many different ways. This includes making certain lifestyle changes to help manage symptoms. Following are some common lifestyle changes that may be included as part of your treatment.  Avoiding certain foods  Your healthcare provider may advise you to avoid certain foods that can worsen your symptoms. These include alcohol, spicy food, chocolate, and caffeine. These can also include citrus fruits and juices, tomatoes, and carbonated drinks. Start by cutting certain foods out of your diet for a few weeks. Then, add the foods back into your diet. See whether this has any effect on your symptoms.  Retraining your bladder  Your provider may also recommend bladder retraining. This involves holding urine in for longer and longer periods. The goal is to stretch the bladder and increase the amount the bladder can hold.  Managing stress  In addition, your provider may teach you various methods to help manage the stress in your life. Stress does not cause interstitial cystitis, but it can make your symptoms worse. Meditation, massage, and yoga are some good ways to relax and relieve stress. Exercise can help relieve stress as well. You may want to start with walking or swimming. These activities are less likely to cause symptoms and may be easier for you to do regularly. Another important lifestyle change that your doctor may prescribe is the use of pelvic myofascial exercises.  Date Last Reviewed: 1/1/2017  © 0959-4714 [employer]. [address] [place] [address], [place] [address]. All rights reserved. This information is not intended as a substitute for professional medical care. Always follow your healthcare professional's instructions.        Bladder  "Infection, Female (Adult)    Urine is normally doesn't have any bacteria in it. But bacteria can get into the urinary tract from the skin around the rectum. Or they can travel in the blood from elsewhere in the body. Once they are in your urinary tract, they can cause infection in the urethra (urethritis), the bladder (cystitis), or the kidneys (pyelonephritis).  The most common place for an infection is in the bladder. This is called a bladder infection. This is one of the most common infections in women. Most bladder infections are easily treated. They are not serious unless the infection spreads to the kidney.  The phrases "bladder infection," "UTI," and "cystitis" are often used to describe the same thing. But they are not always the same. Cystitis is an inflammation of the bladder. The most common cause of cystitis is an infection.  Symptoms  The infection causes inflammation in the urethra and bladder. This causes many of the symptoms. The most common symptoms of a bladder infection are:  · Pain or burning when urinating  · Having to urinate more often than usual  · Urgent need to urinate  · Only a small amount of urine comes out  · Blood in urine  · Abdominal discomfort. This is usually in the lower abdomen above the pubic bone.  · Cloudy urine  · Strong- or bad-smelling urine  · Unable to urinate (urinary retention)  · Unable to hold urine in (urinary incontinence)  · Fever  · Loss of appetite  · Confusion (in older adults)  Causes  Bladder infections are not contagious. You can't get one from someone else, from a toilet seat, or from sharing a bath.  The most common cause of bladder infections is bacteria from the bowels. The bacteria get onto the skin around the opening of the urethra. From there, they can get into the urine and travel up to the bladder, causing inflammation and infection. This usually happens because of:  · Wiping improperly after urinating. Always wipe from front to back.  · Bowel " incontinence  · Pregnancy  · Procedures such as having a catheter inserted  · Older age  · Not emptying your bladder. This can allow bacteria a chance to grow in your urine.  · Dehydration  · Constipation  · Sex  · Use of a diaphragm for birth control   Treatment  Bladder infections are diagnosed by a urine test. They are treated with antibiotics and usually clear up quickly without complications. Treatment helps prevent a more serious kidney infection.  Medicines  Medicines can help in the treatment of a bladder infection:  · Take antibiotics until they are used up, even if you feel better. It is important to finish them to make sure the infection has cleared.  · You can use acetaminophen or ibuprofen for pain, fever, or discomfort, unless another medicine was prescribed. If you have chronic liver or kidney disease, talk with your healthcare provider before using these medicines. Also talk with your provider if you've ever had a stomach ulcer or gastrointestinal bleeding, or are taking blood-thinner medicines.  · If you are given phenazopydridine to reduce burning with urination, it will cause your urine to become a bright orange color. This can stain clothing.  Care and prevention  These self-care steps can help prevent future infections:  · Drink plenty of fluids to prevent dehydration and flush out your bladder. Do this unless you must restrict fluids for other health reasons, or your doctor told you not to.  · Proper cleaning after going to the bathroom is important. Wipe from front to back after using the toilet to prevent the spread of bacteria.  · Urinate more often. Don't try to hold urine in for a long time.  · Wear loose-fitting clothes and cotton underwear. Avoid tight-fitting pants.  · Improve your diet and prevent constipation. Eat more fresh fruit and vegetables, and fiber, and less junk and fatty foods.  · Avoid sex until your symptoms are gone.  · Avoid caffeine, alcohol, and spicy foods. These can  irritate your bladder.  · Urinate right after intercourse to flush out your bladder.  · If you use birth control pills and have frequent bladder infections, discuss it with your doctor.  Follow-up care  Call your healthcare provider if all symptoms are not gone after 3 days of treatment. This is especially important if you have repeat infections.  If a culture was done, you will be told if your treatment needs to be changed. If directed, you can call to find out the results.  If X-rays were done, you will be told if the results will affect your treatment.  Call 911  Call 911 if any of the following occur:  · Trouble breathing  · Hard to wake up or confusion  · Fainting or loss of consciousness  · Rapid heart rate  When to seek medical advice  Call your healthcare provider right away if any of these occur:  · Fever of 100.4ºF (38.0ºC) or higher, or as directed by your healthcare provider  · Symptoms are not better by the third day of treatment  · Back or belly (abdominal) pain that gets worse  · Repeated vomiting, or unable to keep medicine down  · Weakness or dizziness  · Vaginal discharge  · Pain, redness, or swelling in the outer vaginal area (labia)  Date Last Reviewed: 10/1/2016  © 6777-1117 [employer]. [address] [place] [address], [place] [address]. All rights reserved. This information is not intended as a substitute for professional medical care. Always follow your healthcare professional's instructions.

## 2020-09-01 RX ORDER — GABAPENTIN 100 MG/1
100 CAPSULE ORAL 3 TIMES DAILY
Qty: 90 CAPSULE | Refills: 2 | Status: SHIPPED | OUTPATIENT
Start: 2020-09-01 | End: 2020-11-30

## 2020-09-01 NOTE — TELEPHONE ENCOUNTER
----- Message from Evy Delacruz sent at 9/1/2020  4:01 PM CDT -----  Contact: Patient  Type:  RX Refill Request    Who Called: Sana, patient  Refill or New Rx:  Refill  RX Name and Strength:  gabapentin (NEURONTIN) 100 MG capsule  How is the patient currently taking it? (ex. 1XDay):  Take 100 mg by mouth 3 (three) times daily  Is this a 30 day or 90 day RX:  30  Preferred Pharmacy with phone number:    [facility]/pharmacy #[id number] - [place] - [address]  [address] [place] [address]  Phone: [phone] Fax: [phone]  Local or Mail Order:  Local  Ordering Provider:  Dr Vallecillo  Would the patient rather a call back or a response via [facility]?   Phone call  Best Call Back Number:  [phone]  Additional Information: Please advise. Thanks.

## 2020-09-02 NOTE — TELEPHONE ENCOUNTER
Call pt tell OK 3 mo gabapentin but has no recent lab in computer Needs do lab as in computer if not done recently add EKG and Chest Xray

## 2020-09-22 ENCOUNTER — TELEPHONE (OUTPATIENT)
Dept: PRIMARY CARE CLINIC | Facility: CLINIC | Age: 55
End: 2020-09-22

## 2020-09-22 NOTE — TELEPHONE ENCOUNTER
----- Message from Debra Peoples sent at 9/22/2020  1:23 PM CDT -----  Contact: Pt [phone]  Requesting an RX refill or new RX.  Is this a refill or new RX:  refill  RX name and strength: tramadol  Directions (copy/paste from chart):    Is this a 30 day or 90 day RX:    Local pharmacy or mail order pharmacy:  local  Pharmacy name and phone # (copy/paste from chart):   [facility]/pharmacy #[id number] - [place] - [address] [phone] (Phone)  [phone] (Fax)      Comments:

## 2020-10-05 ENCOUNTER — PATIENT MESSAGE (OUTPATIENT)
Dept: ADMINISTRATIVE | Facility: HOSPITAL | Age: 55
End: 2020-10-05

## 2020-10-21 ENCOUNTER — PATIENT MESSAGE (OUTPATIENT)
Dept: DERMATOLOGY | Facility: CLINIC | Age: 55
End: 2020-10-21

## 2020-11-04 ENCOUNTER — PATIENT MESSAGE (OUTPATIENT)
Dept: DERMATOLOGY | Facility: CLINIC | Age: 55
End: 2020-11-04

## 2020-11-30 RX ORDER — GABAPENTIN 100 MG/1
CAPSULE ORAL
Qty: 270 CAPSULE | Refills: 0 | Status: SHIPPED | OUTPATIENT
Start: 2020-11-30 | End: 2021-06-25 | Stop reason: SDUPTHER

## 2020-11-30 NOTE — TELEPHONE ENCOUNTER
Call tell patient gabapentin not refilled over the phone if needs a virtual visit can do by virtual visit refill gabapentin denied

## 2020-12-09 ENCOUNTER — OFFICE VISIT (OUTPATIENT)
Dept: DERMATOLOGY | Facility: CLINIC | Age: 55
End: 2020-12-09
Payer: COMMERCIAL

## 2020-12-09 DIAGNOSIS — D21.9 FIBROMA: ICD-10-CM

## 2020-12-09 DIAGNOSIS — L72.9 CYST OF SKIN: ICD-10-CM

## 2020-12-09 DIAGNOSIS — D22.9 BENIGN MOLE: ICD-10-CM

## 2020-12-09 DIAGNOSIS — Z86.007 HISTORY OF SQUAMOUS CELL CARCINOMA IN SITU (SCCIS): Primary | ICD-10-CM

## 2020-12-09 DIAGNOSIS — L90.5 SCAR: ICD-10-CM

## 2020-12-09 PROCEDURE — 99999 PR PBB SHADOW E&M-EST. PATIENT-LVL III: ICD-10-PCS | Mod: PBBFAC,,, | Performed by: DERMATOLOGY

## 2020-12-09 PROCEDURE — 1126F PR PAIN SEVERITY QUANTIFIED, NO PAIN PRESENT: ICD-10-PCS | Mod: S$GLB,,, | Performed by: DERMATOLOGY

## 2020-12-09 PROCEDURE — 99999 PR PBB SHADOW E&M-EST. PATIENT-LVL III: CPT | Mod: PBBFAC,,, | Performed by: DERMATOLOGY

## 2020-12-09 PROCEDURE — 99202 PR OFFICE/OUTPT VISIT, NEW, LEVL II, 15-29 MIN: ICD-10-PCS | Mod: S$GLB,,, | Performed by: DERMATOLOGY

## 2020-12-09 PROCEDURE — 99202 OFFICE O/P NEW SF 15 MIN: CPT | Mod: S$GLB,,, | Performed by: DERMATOLOGY

## 2020-12-09 PROCEDURE — 1126F AMNT PAIN NOTED NONE PRSNT: CPT | Mod: S$GLB,,, | Performed by: DERMATOLOGY

## 2020-12-09 NOTE — PROGRESS NOTES
Subjective:       Patient ID:  Sana Braden is a 55 y.o. female who presents for   Chief Complaint   Patient presents with    Spot     face, X8mos, bleeding and crusting, no tx      Spot - Initial  Affected locations: face  Duration: 8 months  Signs / symptoms: bleeding and crusting  Severity: mild to moderate  Timing: constant        Review of Systems   Constitutional: Negative.    HENT: Negative.    Respiratory: Negative.    Musculoskeletal: Negative.    Skin: Positive for itching. Negative for rash.        Objective:    Physical Exam   Constitutional: She appears well-developed and well-nourished.   Eyes: No conjunctival no injection.   Neurological: She is alert and oriented to person, place, and time.   Psychiatric: She has a normal mood and affect.   Skin:   Areas Examined (abnormalities noted in diagram):   Head / Face Inspection Performed  Neck Inspection Performed  Nails and Digits Inspection Performed              Diagram Legend     Erythematous scaling macule/papule c/w actinic keratosis       Vascular papule c/w angioma      Pigmented verrucoid papule/plaque c/w seborrheic keratosis      Yellow umbilicated papule c/w sebaceous hyperplasia      Irregularly shaped tan macule c/w lentigo     1-2 mm smooth white papules consistent with Milia      Movable subcutaneous cyst with punctum c/w epidermal inclusion cyst      Subcutaneous movable cyst c/w pilar cyst      Firm pink to brown papule c/w dermatofibroma      Pedunculated fleshy papule(s) c/w skin tag(s)      Evenly pigmented macule c/w junctional nevus     Mildly variegated pigmented, slightly irregular-bordered macule c/w mildly atypical nevus      Flesh colored to evenly pigmented papule c/w intradermal nevus       Pink pearly papule/plaque c/w basal cell carcinoma      Erythematous hyperkeratotic cursted plaque c/w SCC      Surgical scar with no sign of skin cancer recurrence      Open and closed comedones      Inflammatory papules and pustules       Verrucoid papule consistent consistent with wart     Erythematous eczematous patches and plaques     Dystrophic onycholytic nail with subungual debris c/w onychomycosis     Umbilicated papule    Erythematous-base heme-crusted tan verrucoid plaque consistent with inflamed seborrheic keratosis     Erythematous Silvery Scaling Plaque c/w Psoriasis     See annotation      Assessment / Plan:        History of squamous cell carcinoma in situ (SCCIS)  No signs of recurrence are noted in previous surgical areas or scars.    Fibroma  Discussed with patient the benign nature of these lesions and that no treatment is indicated.    Discussed with patient the etiology and pathogenesis of the disease or skin lesion(s) and possible treatments and aggravators.    Instructed patient to use petroleum jelly at least daily on affected areas.  Discussed with patient the importance of not picking at the skin due to risks of infection, non healing, and scarring.We will recheck the r sideburn at our follow up appointment.    Cyst of skin  Discussed with patient the likelihood that this lesion is an epidermal cyst caused by an involuted lining of skin with dead skin trapped inside.  Cysts do not have a malignant potential but can become infected and turn into abscesses with possible cellulitis.  Discussed the option of warm compresses if infected with oral antibiotics.  Discussed the option of surgical excision with scar, possible recurrence, hematoma, and infection.  Patient to consider these options.    Benign mole  Discussed with patient the benign nature of these lesions and that no treatment is indicated.    Discussed with patient the importance of not picking at the skin due to risks of infection, non healing, and scarring.We will recheck the f head and underchin at our follow up appointment.  Instructed patient to use petroleum jelly at least daily on affected areas.  Recommended the usage of over the counter hydrocortisone as  needed for itching.    Scar  No signs of recurrence are noted in previous surgical areas or scars.             Follow up if symptoms worsen or fail to improve, for Procedure.

## 2021-01-13 ENCOUNTER — OFFICE VISIT (OUTPATIENT)
Dept: DERMATOLOGY | Facility: CLINIC | Age: 56
End: 2021-01-13
Payer: COMMERCIAL

## 2021-01-13 DIAGNOSIS — Z85.828 HISTORY OF SKIN CANCER: ICD-10-CM

## 2021-01-13 DIAGNOSIS — L90.5 SCAR: Primary | ICD-10-CM

## 2021-01-13 DIAGNOSIS — L72.9 CYST OF SKIN: ICD-10-CM

## 2021-01-13 PROCEDURE — 99999 PR PBB SHADOW E&M-EST. PATIENT-LVL III: ICD-10-PCS | Mod: PBBFAC,,, | Performed by: DERMATOLOGY

## 2021-01-13 PROCEDURE — 1126F AMNT PAIN NOTED NONE PRSNT: CPT | Mod: S$GLB,,, | Performed by: DERMATOLOGY

## 2021-01-13 PROCEDURE — 99213 OFFICE O/P EST LOW 20 MIN: CPT | Mod: S$GLB,,, | Performed by: DERMATOLOGY

## 2021-01-13 PROCEDURE — 99213 PR OFFICE/OUTPT VISIT, EST, LEVL III, 20-29 MIN: ICD-10-PCS | Mod: S$GLB,,, | Performed by: DERMATOLOGY

## 2021-01-13 PROCEDURE — 1126F PR PAIN SEVERITY QUANTIFIED, NO PAIN PRESENT: ICD-10-PCS | Mod: S$GLB,,, | Performed by: DERMATOLOGY

## 2021-01-13 PROCEDURE — 99999 PR PBB SHADOW E&M-EST. PATIENT-LVL III: CPT | Mod: PBBFAC,,, | Performed by: DERMATOLOGY

## 2021-01-13 RX ORDER — ONDANSETRON 4 MG/1
TABLET, ORALLY DISINTEGRATING ORAL
COMMUNITY
Start: 2020-11-30 | End: 2021-06-25

## 2021-01-20 ENCOUNTER — TELEPHONE (OUTPATIENT)
Dept: DERMATOLOGY | Facility: CLINIC | Age: 56
End: 2021-01-20

## 2021-01-27 ENCOUNTER — PROCEDURE VISIT (OUTPATIENT)
Dept: DERMATOLOGY | Facility: CLINIC | Age: 56
End: 2021-01-27
Payer: COMMERCIAL

## 2021-01-27 DIAGNOSIS — L72.0 EPIDERMAL CYST: Primary | ICD-10-CM

## 2021-01-27 PROCEDURE — 88304 TISSUE EXAM BY PATHOLOGIST: CPT | Mod: 26,,, | Performed by: PATHOLOGY

## 2021-01-27 PROCEDURE — 88304 PR  SURG PATH,LEVEL III: ICD-10-PCS | Mod: 26,,, | Performed by: PATHOLOGY

## 2021-01-27 PROCEDURE — 11442 PR EXC SKIN BENIG 1.1-2 CM FACE,FACIAL: ICD-10-PCS | Mod: S$GLB,,, | Performed by: DERMATOLOGY

## 2021-01-27 PROCEDURE — 88304 TISSUE EXAM BY PATHOLOGIST: CPT | Performed by: PATHOLOGY

## 2021-01-27 PROCEDURE — 12051 INTMD RPR FACE/MM 2.5 CM/<: CPT | Mod: 51,S$GLB,, | Performed by: DERMATOLOGY

## 2021-01-27 PROCEDURE — 12051 PR INTERMED WOUND REPAIR FACE/EAR/EYELID/NOSE/LIP/MUC MEBR, 2.5CM OR LESS: ICD-10-PCS | Mod: 51,S$GLB,, | Performed by: DERMATOLOGY

## 2021-01-27 PROCEDURE — 11442 EXC FACE-MM B9+MARG 1.1-2 CM: CPT | Mod: S$GLB,,, | Performed by: DERMATOLOGY

## 2021-02-02 LAB
FINAL PATHOLOGIC DIAGNOSIS: NORMAL
GROSS: NORMAL
MICROSCOPIC EXAM: NORMAL

## 2021-02-10 ENCOUNTER — CLINICAL SUPPORT (OUTPATIENT)
Dept: DERMATOLOGY | Facility: CLINIC | Age: 56
End: 2021-02-10
Payer: COMMERCIAL

## 2021-02-10 DIAGNOSIS — Z48.02 VISIT FOR SUTURE REMOVAL: Primary | ICD-10-CM

## 2021-02-10 PROCEDURE — 99999 PR PBB SHADOW E&M-EST. PATIENT-LVL II: CPT | Mod: PBBFAC,,,

## 2021-02-10 PROCEDURE — 99024 POSTOP FOLLOW-UP VISIT: CPT | Mod: S$GLB,,, | Performed by: DERMATOLOGY

## 2021-02-10 PROCEDURE — 99999 PR PBB SHADOW E&M-EST. PATIENT-LVL II: ICD-10-PCS | Mod: PBBFAC,,,

## 2021-02-10 PROCEDURE — 99024 PR POST-OP FOLLOW-UP VISIT: ICD-10-PCS | Mod: S$GLB,,, | Performed by: DERMATOLOGY

## 2021-04-05 ENCOUNTER — PATIENT MESSAGE (OUTPATIENT)
Dept: ADMINISTRATIVE | Facility: HOSPITAL | Age: 56
End: 2021-04-05

## 2021-04-26 ENCOUNTER — PATIENT MESSAGE (OUTPATIENT)
Dept: RESEARCH | Facility: HOSPITAL | Age: 56
End: 2021-04-26

## 2021-06-25 ENCOUNTER — OFFICE VISIT (OUTPATIENT)
Dept: PRIMARY CARE CLINIC | Facility: CLINIC | Age: 56
End: 2021-06-25
Payer: COMMERCIAL

## 2021-06-25 VITALS
TEMPERATURE: 98 F | BODY MASS INDEX: 27.52 KG/M2 | RESPIRATION RATE: 18 BRPM | SYSTOLIC BLOOD PRESSURE: 118 MMHG | WEIGHT: 145.75 LBS | OXYGEN SATURATION: 98 % | HEART RATE: 75 BPM | DIASTOLIC BLOOD PRESSURE: 74 MMHG | HEIGHT: 61 IN

## 2021-06-25 DIAGNOSIS — R60.0 LOCALIZED EDEMA: Primary | ICD-10-CM

## 2021-06-25 DIAGNOSIS — R14.0 ABDOMINAL DISTENSION (GASEOUS): ICD-10-CM

## 2021-06-25 DIAGNOSIS — K50.919 CROHN'S DISEASE WITH COMPLICATION, UNSPECIFIED GASTROINTESTINAL TRACT LOCATION: ICD-10-CM

## 2021-06-25 DIAGNOSIS — R10.30 LOWER ABDOMINAL PAIN: ICD-10-CM

## 2021-06-25 DIAGNOSIS — N39.0 RECURRENT UTI: ICD-10-CM

## 2021-06-25 PROBLEM — K29.70 GASTRITIS: Status: RESOLVED | Noted: 2020-08-28 | Resolved: 2021-06-25

## 2021-06-25 PROBLEM — K52.9 ENTERITIS: Status: RESOLVED | Noted: 2020-08-28 | Resolved: 2021-06-25

## 2021-06-25 PROBLEM — R11.0 NAUSEA: Status: RESOLVED | Noted: 2020-08-28 | Resolved: 2021-06-25

## 2021-06-25 PROBLEM — R10.13 ABDOMINAL PAIN, EPIGASTRIC: Status: RESOLVED | Noted: 2020-08-28 | Resolved: 2021-06-25

## 2021-06-25 PROCEDURE — 99999 PR PBB SHADOW E&M-EST. PATIENT-LVL III: ICD-10-PCS | Mod: PBBFAC,,, | Performed by: NURSE PRACTITIONER

## 2021-06-25 PROCEDURE — 3008F BODY MASS INDEX DOCD: CPT | Mod: CPTII,S$GLB,, | Performed by: NURSE PRACTITIONER

## 2021-06-25 PROCEDURE — 3008F PR BODY MASS INDEX (BMI) DOCUMENTED: ICD-10-PCS | Mod: CPTII,S$GLB,, | Performed by: NURSE PRACTITIONER

## 2021-06-25 PROCEDURE — 99999 PR PBB SHADOW E&M-EST. PATIENT-LVL III: CPT | Mod: PBBFAC,,, | Performed by: NURSE PRACTITIONER

## 2021-06-25 PROCEDURE — 99214 PR OFFICE/OUTPT VISIT, EST, LEVL IV, 30-39 MIN: ICD-10-PCS | Mod: S$GLB,,, | Performed by: NURSE PRACTITIONER

## 2021-06-25 PROCEDURE — 99214 OFFICE O/P EST MOD 30 MIN: CPT | Mod: S$GLB,,, | Performed by: NURSE PRACTITIONER

## 2021-06-25 PROCEDURE — 1125F PR PAIN SEVERITY QUANTIFIED, PAIN PRESENT: ICD-10-PCS | Mod: S$GLB,,, | Performed by: NURSE PRACTITIONER

## 2021-06-25 PROCEDURE — 1125F AMNT PAIN NOTED PAIN PRSNT: CPT | Mod: S$GLB,,, | Performed by: NURSE PRACTITIONER

## 2021-06-28 ENCOUNTER — TELEPHONE (OUTPATIENT)
Dept: PRIMARY CARE CLINIC | Facility: CLINIC | Age: 56
End: 2021-06-28

## 2021-07-06 ENCOUNTER — PATIENT MESSAGE (OUTPATIENT)
Dept: ADMINISTRATIVE | Facility: HOSPITAL | Age: 56
End: 2021-07-06

## 2021-07-16 DIAGNOSIS — F41.9 ANXIETY: ICD-10-CM

## 2021-07-17 ENCOUNTER — PATIENT MESSAGE (OUTPATIENT)
Dept: PRIMARY CARE CLINIC | Facility: CLINIC | Age: 56
End: 2021-07-17

## 2021-07-17 DIAGNOSIS — F41.9 ANXIETY: ICD-10-CM

## 2021-07-17 RX ORDER — GABAPENTIN 100 MG/1
CAPSULE ORAL
Qty: 270 CAPSULE | Refills: 1 | Status: SHIPPED | OUTPATIENT
Start: 2021-07-17 | End: 2022-07-05

## 2021-07-17 RX ORDER — SERTRALINE HYDROCHLORIDE 100 MG/1
TABLET, FILM COATED ORAL
Qty: 90 TABLET | Refills: 1 | Status: SHIPPED | OUTPATIENT
Start: 2021-07-17 | End: 2021-07-17 | Stop reason: SDUPTHER

## 2021-07-20 ENCOUNTER — PATIENT MESSAGE (OUTPATIENT)
Dept: PRIMARY CARE CLINIC | Facility: CLINIC | Age: 56
End: 2021-07-20

## 2021-07-21 RX ORDER — HYDROCHLOROTHIAZIDE 12.5 MG/1
12.5 TABLET ORAL DAILY PRN
Qty: 30 TABLET | Refills: 0 | Status: SHIPPED | OUTPATIENT
Start: 2021-07-21 | End: 2021-08-13 | Stop reason: SDUPTHER

## 2021-07-25 RX ORDER — SERTRALINE HYDROCHLORIDE 100 MG/1
100 TABLET, FILM COATED ORAL DAILY
Qty: 90 TABLET | Refills: 1 | Status: SHIPPED | OUTPATIENT
Start: 2021-07-25 | End: 2022-08-12

## 2021-08-05 ENCOUNTER — IMMUNIZATION (OUTPATIENT)
Dept: PHARMACY | Facility: CLINIC | Age: 56
End: 2021-08-05
Payer: COMMERCIAL

## 2021-08-05 DIAGNOSIS — Z23 NEED FOR VACCINATION: Primary | ICD-10-CM

## 2021-08-13 ENCOUNTER — PATIENT MESSAGE (OUTPATIENT)
Dept: PRIMARY CARE CLINIC | Facility: CLINIC | Age: 56
End: 2021-08-13

## 2021-08-13 RX ORDER — HYDROCHLOROTHIAZIDE 12.5 MG/1
12.5 TABLET ORAL DAILY PRN
Qty: 30 TABLET | Refills: 0 | Status: SHIPPED | OUTPATIENT
Start: 2021-08-13 | End: 2021-08-16

## 2021-10-05 ENCOUNTER — PATIENT MESSAGE (OUTPATIENT)
Dept: ADMINISTRATIVE | Facility: HOSPITAL | Age: 56
End: 2021-10-05

## 2021-12-04 RX ORDER — HYDROCHLOROTHIAZIDE 12.5 MG/1
12.5 TABLET ORAL DAILY
Qty: 90 TABLET | Refills: 0 | Status: SHIPPED | OUTPATIENT
Start: 2021-12-04 | End: 2022-02-09 | Stop reason: SDUPTHER

## 2022-02-09 ENCOUNTER — PATIENT MESSAGE (OUTPATIENT)
Dept: PRIMARY CARE CLINIC | Facility: CLINIC | Age: 57
End: 2022-02-09

## 2022-02-09 NOTE — TELEPHONE ENCOUNTER
Care Due:                  Date            Visit Type   Department     Provider  --------------------------------------------------------------------------------                                [place]      [facility]  Last Visit: 08-      AUDIO ONLY   PRIMARY CARE   Kelton Vallecillo  Next Visit: None Scheduled  None         None Found                                                            Last  Test          Frequency    Reason                     Performed    Due Date  --------------------------------------------------------------------------------    Office Visit  12 months..  hydroCHLOROthiazide,       08- 08-                             sertraline...............    Powered by [employer]. Reference number: [id number].   2/08/2022 6:36:47 PM CST

## 2022-02-09 NOTE — TELEPHONE ENCOUNTER
No new care gaps identified.  Powered by [employer]. Reference number: [id number].   2/09/2022 12:37:24 PM CST

## 2022-02-10 ENCOUNTER — OFFICE VISIT (OUTPATIENT)
Dept: PRIMARY CARE CLINIC | Facility: CLINIC | Age: 57
End: 2022-02-10
Payer: COMMERCIAL

## 2022-02-10 DIAGNOSIS — N95.1 MENOPAUSAL SYNDROME: ICD-10-CM

## 2022-02-10 DIAGNOSIS — K50.919 CROHN'S DISEASE WITH COMPLICATION, UNSPECIFIED GASTROINTESTINAL TRACT LOCATION: ICD-10-CM

## 2022-02-10 DIAGNOSIS — R60.0 PERIPHERAL EDEMA: Primary | ICD-10-CM

## 2022-02-10 DIAGNOSIS — F41.9 ANXIETY: ICD-10-CM

## 2022-02-10 PROCEDURE — 99213 OFFICE O/P EST LOW 20 MIN: CPT | Mod: 95,,, | Performed by: FAMILY MEDICINE

## 2022-02-10 PROCEDURE — 99213 PR OFFICE/OUTPT VISIT, EST, LEVL III, 20-29 MIN: ICD-10-PCS | Mod: 95,,, | Performed by: FAMILY MEDICINE

## 2022-02-10 RX ORDER — POTASSIUM CHLORIDE 750 MG/1
10 CAPSULE, EXTENDED RELEASE ORAL ONCE
Qty: 30 CAPSULE | Refills: 5 | Status: SHIPPED | OUTPATIENT
Start: 2022-02-10 | End: 2022-02-10

## 2022-02-10 RX ORDER — FUROSEMIDE 40 MG/1
40 TABLET ORAL DAILY
Qty: 30 TABLET | Refills: 5 | Status: SHIPPED | OUTPATIENT
Start: 2022-02-10 | End: 2022-07-28 | Stop reason: SDUPTHER

## 2022-02-10 RX ORDER — HYDROCHLOROTHIAZIDE 12.5 MG/1
12.5 TABLET ORAL DAILY
Qty: 90 TABLET | Refills: 1 | Status: SHIPPED | OUTPATIENT
Start: 2022-02-10 | End: 2022-02-10

## 2022-02-11 RX ORDER — HYDROCHLOROTHIAZIDE 12.5 MG/1
12.5 TABLET ORAL DAILY
Qty: 90 TABLET | Refills: 0 | OUTPATIENT
Start: 2022-02-11

## 2022-02-11 NOTE — PROGRESS NOTES
Subjective:       Patient ID: Sana Braden is a 57 y.o. female.    Chief Complaint: No chief complaint on file.    HPI:  57-year-old female patient with to discuss decreased medication--patient on hydrochlorothiazide 12.5.--swelling of ankles lower leg and abdomen.  Okay as long as takes the hydrochlorothiazide but once off of it for weeks and starts to retain fluid.     ROS:  Skin: no psoriasis, eczema, skin cancer  HEENT:  No headache   ,no  ocular pain, no  blurred vision, diplopia, epistaxis, hoarseness change in voice, thyroid trouble +glass  Lung: No pneumonia, asthma, Tb, wheezing, SOB--no smoke  Heart: No chest pain, +ankle edema, palpitations, MI, phu murmur, hypertension, hyperlipidemia no stent bypass arrhythmia  Abdomen: +occas nausea, +occas   vomiting, +abdominal pain more in the epigastric area and mid back--no  diarrhea, constipation, ulcers, hepatitis, gallbladder disease, melena, hematochezia, hematemesis +history Crohn's disease been doing pretty well  : no UTI, renal disease, stones history UTI's on off  GYN hyst, had mammogram few months ago no vaginal discharge  MS: no fractures, O/A, lupus, rheumatoid, gout  Neuro:no   dizziness, LOC, seizures    No diabetes, no anemia, + anxiety, + depression   Single, no children, work [employer], Lives with sister     Objective:   Physical Exam:  No physical exam was done this was a phone call visit physical below was from a prior office visit  General: Well nourished, well developed, no acute distress  Skin: No lesions  HEENT: Eyes PERRLA, EOM intact, nose clear discharge , throat +1/4 erythematous ears TMs clear  NECK: Supple, no bruits, No JVD, no nodes  Lungs: Clear, no rales,  +coarse cough +rhonchi +rare wheeze  Heart: Regular rate and rhythm, no murmurs, gallops, or rubs  Abdomen: flat, bowel sounds positive, no tenderness, or organomegaly  MS: Range of motion and muscle strength intact reflexes 2/4  Neuro: Alert, CN intact, oriented X 3  Romberg negative heel-toe intact  Extremities: No cyanosis, clubbing, or edema         Assessment:       1. Peripheral edema    2. Anxiety    3. Menopausal syndrome    4. Crohn's disease with complication, unspecified gastrointestinal tract location        Plan:       Peripheral edema    Anxiety    Menopausal syndrome    Crohn's disease with complication, unspecified gastrointestinal tract location    Other orders  -     furosemide (LASIX) 40 MG tablet; Take 1 tablet (40 mg total) by mouth once daily.  Dispense: 30 tablet; Refill: 5  -     potassium chloride (MICRO-K) 10 MEQ CpSR; Take 1 capsule (10 mEq total) by mouth once. for 1 dose  Dispense: 30 capsule; Refill: 5        Main Reason for call peripheral edema--patient on hydrochlorothiazide 12.5--told if swelling better to be on Lasix 40 mg 1 p.o. q.d. Micro-K 10 1 p.o. q.d. with this--Needs limit fluids to a 1000 cc per day when swollen elevate feet for 20 minutes in the morning and afternoon above the heart.  Other medical issues   Recurrent UTI--has appointment urologist Monday--may benefit from cystoscopy to rule out interstitial cystitis  Lab patient needs to do lab as in computer CBCs CMP stool guaiac thyroid hepatitis C HIV  Health maintenance hepatitis C lipid HIV tetanus colorectal screen shingles       .      Established Patient - Audio Only Telehealth Visit     The patient location is:  Home  The chief complaint leading to consultation is:  Peripheral edema  Visit type: Virtual visit with audio only (telephone)  Total time spent with patient:  30 minutes       The reason for the audio only service rather than synchronous audio and video virtual visit was related to technical difficulties or patient preference/necessity.     Each patient to whom I provide medical services by telemedicine is:  (1) informed of the relationship between the physician and patient and the respective role of any other health care provider with respect to management of the  patient; and (2) notified that they may decline to receive medical services by telemedicine and may withdraw from such care at any time. Patient verbally consented to receive this service via voice-only telephone call.       HPI:  See history of present illness above     Assessment and plan:  See plan above                        This service was not originating from a related E/M service provided within the previous 7 days nor will  to an E/M service or procedure within the next 24 hours or my soonest available appointment.  Prevailing standard of care was able to be met in this audio-only visit.

## 2022-02-20 NOTE — TELEPHONE ENCOUNTER
No new care gaps identified.  Powered by [facility] [facility] [employer]. Reference number: [id number].   2/20/2022 12:33:56 PM CST

## 2022-02-24 RX ORDER — ONDANSETRON 4 MG/1
TABLET, ORALLY DISINTEGRATING ORAL
Qty: 30 TABLET | Refills: 5 | Status: SHIPPED | OUTPATIENT
Start: 2022-02-24 | End: 2023-03-12

## 2022-02-24 RX ORDER — ONDANSETRON 4 MG/1
8 TABLET, FILM COATED ORAL EVERY 6 HOURS PRN
Qty: 30 TABLET | Refills: 2 | Status: SHIPPED | OUTPATIENT
Start: 2022-02-24

## 2022-03-14 DIAGNOSIS — Z12.31 OTHER SCREENING MAMMOGRAM: ICD-10-CM

## 2022-04-13 RX ORDER — FUROSEMIDE 40 MG/1
40 TABLET ORAL DAILY
Qty: 30 TABLET | Refills: 5 | Status: CANCELLED | OUTPATIENT
Start: 2022-04-13 | End: 2023-04-13

## 2022-04-14 NOTE — TELEPHONE ENCOUNTER
Care Due:                  Date            Visit Type   Department     Provider  --------------------------------------------------------------------------------                                CITLALI      [facility]  Last Visit: 02-      AUDIO ONLY   PRIMARY CARE   Kelton Vallecillo  Next Visit: None Scheduled  None         None Found                                                            Last  Test          Frequency    Reason                     Performed    Due Date  --------------------------------------------------------------------------------    CMP.........  12 months..  furosemide...............  06- 06-    Powered by [employer] by [employer]. Reference number: [id number].   4/13/2022 8:00:36 PM CDT

## 2022-05-31 ENCOUNTER — PATIENT MESSAGE (OUTPATIENT)
Dept: ADMINISTRATIVE | Facility: HOSPITAL | Age: 57
End: 2022-05-31

## 2022-07-04 NOTE — TELEPHONE ENCOUNTER
Care Due:                  Date            Visit Type   Department     Provider  --------------------------------------------------------------------------------                                CITLALI      [facility]  Last Visit: 02-      AUDIO ONLY   PRIMARY CARE   Kelton Vallecillo  Next Visit: None Scheduled  None         None Found                                                            Last  Test          Frequency    Reason                     Performed    Due Date  --------------------------------------------------------------------------------    CMP.........  12 months..  furosemide...............  06- 06-    [facility] Embedded Care Gaps. Reference number: [id number]. 7/04/2022   1:19:53 PM CDT

## 2022-07-05 RX ORDER — GABAPENTIN 100 MG/1
CAPSULE ORAL
Qty: 270 CAPSULE | Refills: 0 | Status: SHIPPED | OUTPATIENT
Start: 2022-07-05

## 2022-07-11 ENCOUNTER — PATIENT MESSAGE (OUTPATIENT)
Dept: ADMINISTRATIVE | Facility: HOSPITAL | Age: 57
End: 2022-07-11
Payer: COMMERCIAL

## 2022-07-27 ENCOUNTER — PATIENT MESSAGE (OUTPATIENT)
Dept: PRIMARY CARE CLINIC | Facility: CLINIC | Age: 57
End: 2022-07-27
Payer: COMMERCIAL

## 2022-07-28 ENCOUNTER — OFFICE VISIT (OUTPATIENT)
Dept: PRIMARY CARE CLINIC | Facility: CLINIC | Age: 57
End: 2022-07-28
Payer: COMMERCIAL

## 2022-07-28 DIAGNOSIS — R19.07 ABDOMINAL SWELLING, GENERALIZED: ICD-10-CM

## 2022-07-28 DIAGNOSIS — R10.10 PAIN OF UPPER ABDOMEN: ICD-10-CM

## 2022-07-28 DIAGNOSIS — R60.0 PERIPHERAL EDEMA: ICD-10-CM

## 2022-07-28 DIAGNOSIS — N95.1 MENOPAUSAL SYNDROME: Primary | ICD-10-CM

## 2022-07-28 DIAGNOSIS — K50.10 CROHN'S DISEASE OF LARGE INTESTINE WITHOUT COMPLICATION: ICD-10-CM

## 2022-07-28 PROCEDURE — 99214 PR OFFICE/OUTPT VISIT, EST, LEVL IV, 30-39 MIN: ICD-10-PCS | Mod: 95,,, | Performed by: FAMILY MEDICINE

## 2022-07-28 PROCEDURE — 99214 OFFICE O/P EST MOD 30 MIN: CPT | Mod: 95,,, | Performed by: FAMILY MEDICINE

## 2022-07-28 RX ORDER — POTASSIUM CHLORIDE 750 MG/1
10 CAPSULE, EXTENDED RELEASE ORAL ONCE
Qty: 30 CAPSULE | Refills: 5 | Status: SHIPPED | OUTPATIENT
Start: 2022-07-28 | End: 2022-07-28

## 2022-07-28 RX ORDER — FUROSEMIDE 40 MG/1
40 TABLET ORAL DAILY
Qty: 30 TABLET | Refills: 5 | Status: SHIPPED | OUTPATIENT
Start: 2022-07-28 | End: 2022-09-05

## 2022-07-28 NOTE — PROGRESS NOTES
Subjective:       Patient ID: Sana Braden is a 57 y.o. female.    Chief Complaint: No chief complaint on file.    HPI:  57-year-old female --in for medication refills--still has fluid in belly --swells if no fluid pill -- swells entire upper and lower belly--now some SOB --if on fluid pill OK as urinating can fell herself breathing better .  No nausea vomiting diarrhea does have some loose bowel movement--no ulcers gallbladder disease or hepatitis--no melena hematochezia hematemesis--hx crohn's dz has been awhile since flair  History UTI in the past no stones no renal insufficiency  Gyn hyst   On gabapentin for restless leg      ROS:  Skin: no psoriasis, eczema, skin cancer  HEENT:  No headache   ,no  ocular pain, no  blurred vision, diplopia, epistaxis, hoarseness change in voice, thyroid trouble +glass  Lung: No pneumonia, asthma, Tb, wheezing,+SOB occas with abd swelling --no smoke  Heart: No chest pain, +ankle edema, palpitations, MI, phu murmur, hypertension, hyperlipidemia no stent bypass arrhythmia  Abdomen: no  nausea, +   vomiting, +abdominal pain more in the epigastric area and mid back--no  diarrhea, constipation, ulcers, hepatitis, gallbladder disease, melena, hematochezia, hematemesis +history Crohn's disease been doing pretty well  : no UTI, renal disease, stones history UTI's on off  GYN hyst, had mammogram few months ago no vaginal discharge  MS: no fractures, O/A, lupus, rheumatoid, gout  Neuro:no   dizziness, LOC, seizures    No diabetes, no anemia, + anxiety, + depression   Single, no children, work [employer], Lives with sister     Objective:   Physical Exam:  No physical exam was done this was a phone call visit physical below was from a prior office visit  General: Well nourished, well developed, no acute distress  Skin: No lesions  HEENT: Eyes PERRLA, EOM intact, nose clear discharge , throat +1/4 erythematous ears TMs clear  NECK: Supple, no bruits, No JVD, no nodes  Lungs: Clear, no  rales,  +coarse cough +rhonchi +rare wheeze  Heart: Regular rate and rhythm, no murmurs, gallops, or rubs  Abdomen: flat, bowel sounds positive, no tenderness, or organomegaly  MS: Range of motion and muscle strength intact reflexes 2/4  Neuro: Alert, CN intact, oriented X 3 Romberg negative heel-toe intact  Extremities: No cyanosis, clubbing, or edema         Assessment:       1. Menopausal syndrome    2. Pain of upper abdomen    3. Abdominal swelling, generalized    4. Crohn's disease of large intestine without complication    5. Peripheral edema        Plan:       Menopausal syndrome    Pain of upper abdomen  -     CBC Auto Differential; Future; Expected date: 07/28/2022  -     Comprehensive Metabolic Panel; Future; Expected date: 07/28/2022  -     EKG 12-lead; Future  -     X-Ray Chest PA And Lateral; Future; Expected date: 07/28/2022  -     Lipid Panel; Future; Expected date: 07/28/2022  -     TSH; Future; Expected date: 07/28/2022  -     POCT urine dipstick without microscope  -     CT Abdomen Pelvis  Without Contrast; Future; Expected date: 07/28/2022  -     Ambulatory referral/consult to Gastroenterology; Future; Expected date: 08/04/2022    Abdominal swelling, generalized    Crohn's disease of large intestine without complication    Peripheral edema    Other orders  -     furosemide (LASIX) 40 MG tablet; Take 1 tablet (40 mg total) by mouth once daily.  Dispense: 30 tablet; Refill: 5  -     potassium chloride (MICRO-K) 10 MEQ CpSR; Take 1 capsule (10 mEq total) by mouth once. for 1 dose  Dispense: 30 capsule; Refill: 5        Main Reason forvisit  Abdominal pain-generalized/with abdominal swelling lower abdomen in upper abdomen--usually relieved with Lasix 40 mg 1 per day with Micro-K 1 p.o. q.o.d.--will do routine lab CBCs CMP lipid T4 TSH chest x-ray EKG--CT scan abdomen and pelvis-GI evaluation for abdominal swelling  Other medical issues   Recurrent UTI--has appointment urologist Monday--may benefit  from cystoscopy to rule out interstitial cystitis  Health maintenance hepatitis C lipid HIV tetanus colorectal screen shingles    Established Patient - Audio Only Telehealth Visit     The patient location is:  Home  The chief complaint leading to consultation is:  Abdominal pain-generalized with abdominal swelling  Visit type: Virtual visit with audio only (telephone)  Total time spent with patient:  30 minutes       The reason for the audio only service rather than synchronous audio and video virtual visit was related to technical difficulties or patient preference/necessity.     Each patient to whom I provide medical services by telemedicine is:  (1) informed of the relationship between the physician and patient and the respective role of any other health care provider with respect to management of the patient; and (2) notified that they may decline to receive medical services by telemedicine and may withdraw from such care at any time. Patient verbally consented to receive this service via voice-only telephone call.       HPI:  See history of present illness above     Assessment and plan:  See plan above                        This service was not originating from a related E/M service provided within the previous 7 days nor will  to an E/M service or procedure within the next 24 hours or my soonest available appointment.  Prevailing standard of care was able to be met in this audio-only visit.

## 2022-08-04 ENCOUNTER — TELEPHONE (OUTPATIENT)
Dept: PRIMARY CARE CLINIC | Facility: CLINIC | Age: 57
End: 2022-08-04
Payer: COMMERCIAL

## 2022-08-04 NOTE — TELEPHONE ENCOUNTER
Returned call to pre service. The lady I spoke too stated that the referral is in for the Ct scan and it is approved by the insurance already. She stated disregard the email.

## 2022-08-04 NOTE — TELEPHONE ENCOUNTER
----- Message from Gabriella Kaufman sent at 8/4/2022 10:13 AM CDT -----  Contact: [phone] [facility] service is calling they need a referral a CT abdomin please advise     Fax [phone]

## 2022-08-12 DIAGNOSIS — F41.9 ANXIETY: ICD-10-CM

## 2022-08-12 RX ORDER — SERTRALINE HYDROCHLORIDE 100 MG/1
TABLET, FILM COATED ORAL
Qty: 90 TABLET | Refills: 3 | Status: SHIPPED | OUTPATIENT
Start: 2022-08-12 | End: 2023-09-22

## 2022-08-12 NOTE — TELEPHONE ENCOUNTER
No new care gaps identified.  [facility] Embedded Care Gaps. Reference number: [id number]. 8/12/2022   2:26:03 AM CDT

## 2022-08-12 NOTE — TELEPHONE ENCOUNTER
Refill Decision Note   Sana Sampson  is requesting a refill authorization.  Brief Assessment and Rationale for Refill:  Approve     Medication Therapy Plan:       Medication Reconciliation Completed: No   Comments:     No Care Gaps recommended.     Note composed:7:10 AM 08/12/2022

## 2022-08-15 ENCOUNTER — OFFICE VISIT (OUTPATIENT)
Dept: PRIMARY CARE CLINIC | Facility: CLINIC | Age: 57
End: 2022-08-15
Payer: COMMERCIAL

## 2022-08-15 VITALS
BODY MASS INDEX: 25.32 KG/M2 | SYSTOLIC BLOOD PRESSURE: 90 MMHG | HEART RATE: 60 BPM | RESPIRATION RATE: 18 BRPM | WEIGHT: 134.13 LBS | HEIGHT: 61 IN | TEMPERATURE: 97 F | OXYGEN SATURATION: 97 % | DIASTOLIC BLOOD PRESSURE: 70 MMHG

## 2022-08-15 DIAGNOSIS — R10.31 ABDOMINAL PAIN, RIGHT LOWER QUADRANT: ICD-10-CM

## 2022-08-15 DIAGNOSIS — Z90.49 HISTORY OF PARTIAL COLECTOMY: ICD-10-CM

## 2022-08-15 DIAGNOSIS — I88.0 MESENTERIC LYMPHADENITIS: ICD-10-CM

## 2022-08-15 DIAGNOSIS — R10.13 ABDOMINAL PAIN, EPIGASTRIC: Primary | ICD-10-CM

## 2022-08-15 DIAGNOSIS — Z11.4 ENCOUNTER FOR SCREENING FOR HIV: ICD-10-CM

## 2022-08-15 DIAGNOSIS — Z11.59 NEED FOR HEPATITIS C SCREENING TEST: ICD-10-CM

## 2022-08-15 DIAGNOSIS — F41.9 ANXIETY: ICD-10-CM

## 2022-08-15 PROCEDURE — 99214 OFFICE O/P EST MOD 30 MIN: CPT | Mod: S$GLB,,, | Performed by: FAMILY MEDICINE

## 2022-08-15 PROCEDURE — 99999 PR PBB SHADOW E&M-EST. PATIENT-LVL V: CPT | Mod: PBBFAC,,, | Performed by: FAMILY MEDICINE

## 2022-08-15 PROCEDURE — 99999 PR PBB SHADOW E&M-EST. PATIENT-LVL V: ICD-10-PCS | Mod: PBBFAC,,, | Performed by: FAMILY MEDICINE

## 2022-08-15 PROCEDURE — 99214 PR OFFICE/OUTPT VISIT, EST, LEVL IV, 30-39 MIN: ICD-10-PCS | Mod: S$GLB,,, | Performed by: FAMILY MEDICINE

## 2022-08-15 RX ORDER — OMEPRAZOLE 40 MG/1
40 CAPSULE, DELAYED RELEASE ORAL DAILY
Qty: 30 CAPSULE | Refills: 5 | Status: SHIPPED | OUTPATIENT
Start: 2022-08-15 | End: 2023-08-15

## 2022-08-15 RX ORDER — CIPROFLOXACIN 500 MG/1
500 TABLET ORAL 2 TIMES DAILY
Qty: 20 TABLET | Refills: 0 | Status: SHIPPED | OUTPATIENT
Start: 2022-08-15 | End: 2022-08-25

## 2022-08-15 RX ORDER — POTASSIUM CHLORIDE 750 MG/1
CAPSULE, EXTENDED RELEASE ORAL
COMMUNITY
Start: 2022-07-28 | End: 2023-01-09 | Stop reason: SDUPTHER

## 2022-08-15 RX ORDER — TRAMADOL HYDROCHLORIDE 50 MG/1
50 TABLET ORAL EVERY 6 HOURS PRN
Qty: 30 TABLET | Refills: 0 | Status: SHIPPED | OUTPATIENT
Start: 2022-08-15 | End: 2022-08-25

## 2022-08-15 RX ORDER — HYDROCHLOROTHIAZIDE 12.5 MG/1
12.5 TABLET ORAL DAILY
COMMUNITY
Start: 2022-06-25 | End: 2022-09-05

## 2022-08-15 NOTE — PROGRESS NOTES
Subjective:       Patient ID: Sana Braden is a 57 y.o. female.    Chief Complaint: Follow-up    HPI:  57-year-old female -last night was a bad night---pain in the epigastric area---whenever has BM burns --has appoint with GI mg in October.  Nausea this a.m. felt like going to vomit but did no diarrhea--no ulcers hepatitis gallbladder disease--no melena hematochezia hematemesis       CT scan showed a partial colectomy due to colonic mass removed in 2012--also showed right lower quadrant meds are icteric lymph node patient states is having to constantly push in the right lower abdomen due to pain hepatics cyst. Was suppose get cipro .      ROS:  Skin: no psoriasis, eczema, skin cancer  HEENT:  No headache   ,no  ocular pain, no  blurred vision, diplopia, epistaxis, hoarseness change in voice, thyroid trouble +glass  Lung: No pneumonia, asthma, Tb, wheezing,+SOB occas with abd swelling --no smoke  Heart: No chest pain, +ankle edema, palpitations, MI, phu murmur, hypertension, hyperlipidemia no stent bypass arrhythmia  Abdomen: no  nausea, +   vomiting, +abdominal pain more in the epigastric area and mid back--no  diarrhea, constipation, ulcers, hepatitis, gallbladder disease, melena, hematochezia, hematemesis +history Crohn's disease been doing pretty well  : no UTI, renal disease, stones history UTI's on off  GYN hyst, had mammogram few months ago no vaginal discharge  MS: no fractures, O/A, lupus, rheumatoid, gout  Neuro:no   dizziness, LOC, seizures    No diabetes, no anemia, + anxiety, + depression   Single, no children, work [employer], Lives with sister     Objective:   Physical Exam:   General: Well nourished, well developed, no acute distress  Skin: No lesions  HEENT: Eyes PERRLA, EOM intact, nose clear discharge , throat nonerythematous ears TMs clear  NECK: Supple, no bruits, No JVD, no nodes  Lungs: Clear, no rales,  rhonchi or wheezes  Heart: Regular rate and rhythm, no murmurs, gallops, or  rubs  Abdomen: scar entire midline abd --pt states occas left bulging --but occas entore upper abd bloated --tenderness especially in the right lower quad--had appendectomy--CT scan shows lymph node--patient may have adhesions from prior surgery  MS: Range of motion and muscle strength intact reflexes 2/4  Neuro: Alert, CN intact, oriented X 3 Romberg negative heel-toe intact  Extremities: No cyanosis, clubbing, or edema         Assessment:       1. Abdominal pain, epigastric    2. Abdominal pain, right lower quadrant    3. Mesenteric lymphadenitis    4. Anxiety    5. History of partial colectomy    6. Encounter for screening for HIV    7. Need for hepatitis C screening test        Plan:       Abdominal pain, epigastric  -     Case Request Endoscopy: COLONOSCOPY  -     Ambulatory referral/consult to Gastroenterology; Future; Expected date: 08/22/2022    Abdominal pain, right lower quadrant    Mesenteric lymphadenitis    Anxiety    History of partial colectomy    Encounter for screening for HIV  -     HIV 1/2 Ag/Ab (4th Gen); Future; Expected date: 08/15/2022    Need for hepatitis C screening test  -     Hepatitis C Antibody; Future; Expected date: 08/15/2022    Other orders  -     omeprazole (PRILOSEC) 40 MG capsule; Take 1 capsule (40 mg total) by mouth once daily.  Dispense: 30 capsule; Refill: 5  -     ciprofloxacin HCl (CIPRO) 500 MG tablet; Take 1 tablet (500 mg total) by mouth 2 (two) times daily. for 10 days  Dispense: 20 tablet; Refill: 0  -     traMADoL (ULTRAM) 50 mg tablet; Take 1 tablet (50 mg total) by mouth every 6 (six) hours as needed.  Dispense: 30 tablet; Refill: 0        Main Reason forvisit  History partial colectomy due to colonic mass 2012  Abdominal pain epigastric with bowel movements burning sensation--omeprazole 40 mg 1 p.o. q.d.  Abdominal pain right lower quadrant--CT scans showed colonic lymph adenitis--will treat with Cipro 500 mg 1 p.o. b.i.d. times 10 days  Lab CBCs CMP lipids  TSH  Tramadol for pain  Consult GI patient needs EGD colonoscopy--if negative may need to have exploratory laparotomy to rule out adhesions  CT scan also showed hepatic cyst  Health maintenance hepatitis C lipid HIV tetanus colorectal screen shingles mammogram

## 2022-08-17 ENCOUNTER — PATIENT MESSAGE (OUTPATIENT)
Dept: PRIMARY CARE CLINIC | Facility: CLINIC | Age: 57
End: 2022-08-17
Payer: COMMERCIAL

## 2022-08-19 ENCOUNTER — TELEPHONE (OUTPATIENT)
Dept: SURGERY | Facility: CLINIC | Age: 57
End: 2022-08-19
Payer: COMMERCIAL

## 2022-08-19 ENCOUNTER — PATIENT MESSAGE (OUTPATIENT)
Dept: SURGERY | Facility: CLINIC | Age: 57
End: 2022-08-19
Payer: COMMERCIAL

## 2022-08-19 NOTE — TELEPHONE ENCOUNTER
Called patient in reference to a referral to Colorectal Surgery for colon cancer screening. Patient verbally consented to a Colonoscopy and requested to be scheduled for a Colonoscopy on 10/20/2022 Patient was advised a designated  is required on the day of the Colonoscopy to drive the patient home and the  must be at least. 18 years old.Colonoscopy Prep instructions were thoroughly explained and discussed with the patient.   It was emphasized, and reiterated to the patient, not to follow the prep instructions that comes with the Prep Kit. However, to please follow the prep instructions that will be received in the mail from the [facility]   Patient acknowledges understanding Prep instructions as explained and discussed on the phone.. Patient was advised the Colonoscopy Prep instructions discussed and explained on the phone,are being mailed out to the patient's verified address on file. Patient's address on file was verified with the patient for accuracy of mailing. Patient's medications on file was reviewed with the patient for accuracy of information. Patient denies taking  any other medications other than those listed and verified on medication profile.Patient was explained the Colonoscopy will be performed here at [facility]. Patient was further explained the Pre-Op will call one day prior to the procedure date, to discuss Pre-Op instructions;and what time to report for the Colonoscopy. The patient was given the opportunity to ask any questions about the Colonoscopy. No further issues were discussed.  Bowel Prep/SUPREP instructions                                               [facility]    [address] [place] [address]      You are scheduled for a Colonoscopy with _______________________ on ____________________   At [facility] in [place].    Check in at the hospital on 1st floor Registration area next to Emergency room.    Please  call [phone] to reschedule or if you have any questions.    An adult friend/family member must come with you to drive you home.  You cannot drive, take a taxi, Uber/Lyft or bus to leave the Hospital alone. If you do not have someone with you to drive you home, your test will be cancelled.       Please follow the directions of your doctor if you take any pills that thin your blood.  If you take these meds: Aggrenox, Brilinta, Effient, Eliquis, Lovenox, Plavix, Pletal Pradaxa ticilid, Xarelto, or Coumadin, let the doctor's office know.    Don't: On the morning of the test do not take insulin or pills for diabetes.   Do: On the morning of the test, do take any pills for blood pressure, heart, anti-rejection and or seizures with a small sip of water. Bring any inhalers with you day of procedure.    To have a good prep, you must follow these instructions- please do not use the directions from the pharmacy!      The doctor will send a prescription for the SUPREP   The day Before the test:   You can only drink CLEAR LIQUIDS the whole day before your test. You can't eat any food for the whole day.    You CAN have:   Water,Coffee or decaf coffee (no milk or cream)    Tea   Soft drinks- regular and sugar free   Jell-O (green or yellow)   Apple Juice, grape juice, white cranberry juice   Gatorade, Power Aid, Crystal Light, Damián Aid   Lemonade and Limeade   Bouillon, clear soup   Snowball, popsicles   YOU CAN'T DRINK ANYTHING RED   YOU CAN'T DRINK ALCOHOL   ONLY DRINK WHAT IS ON THIS List      At 5pm the night before your test:   Pour the 1st bottle of SUPREP into the cup provided in the box.  Add water to the line on the cup and mix well. Drink the whole cup and then drink 2 more full cups of water over the 1 hour.    This can be easier to drink if it is cold.  You can mix it 20 minutes ahead of time and place in the refrigerator before you drink it.  You must drink it within 30-45 minutes of mixing it. Do NOT pour the  drink over ice. You can drink it with a straw.    The Day of the test- We will call you 1 day before your test to tell you what time to get there.    5 hours before you come to the hospital (this may be the middle of the night)   Pour the 2nd bottle of SUPREP into to the cup provided in the box. Add water to the line on the cup and mix well. Drink the whole cup and then drink 2 more full cups of water over 1 hour.    It might be easier to drink if it is cold. You can mix it 20 minutes ahead of time and place in the refrigerator before you drink it. You must drink it within 30-45 minutes of mixing it. Do NOT pour the drink over ice. You can drink it with a straw.   YOU CAN'T EAT OR DRINK ANYTHING ELSE ONCE YOU FINISH THE PREP.   Leave all valuables and jewelry at home. You will be at the hospital for 2-4 hours.    Call the Endoscopy Scheduling Department at [phone] with any questions about your procedure.    Please  your medication from your local pharmacy. If you are unable to  the SUPREP Kit please contact our office.   Thank you   Endo Scheduling Dept   [facility]

## 2022-08-23 RX ORDER — SODIUM, POTASSIUM,MAG SULFATES 17.5-3.13G
1 SOLUTION, RECONSTITUTED, ORAL ORAL DAILY
Qty: 1 KIT | Refills: 0 | Status: SHIPPED | OUTPATIENT
Start: 2022-08-23 | End: 2022-08-25

## 2022-09-27 ENCOUNTER — PATIENT MESSAGE (OUTPATIENT)
Dept: PRIMARY CARE CLINIC | Facility: CLINIC | Age: 57
End: 2022-09-27
Payer: COMMERCIAL

## 2022-10-10 ENCOUNTER — PATIENT MESSAGE (OUTPATIENT)
Dept: PRIMARY CARE CLINIC | Facility: CLINIC | Age: 57
End: 2022-10-10
Payer: COMMERCIAL

## 2022-10-11 ENCOUNTER — PATIENT MESSAGE (OUTPATIENT)
Dept: PRIMARY CARE CLINIC | Facility: CLINIC | Age: 57
End: 2022-10-11
Payer: COMMERCIAL

## 2022-10-11 DIAGNOSIS — R60.0 PERIPHERAL EDEMA: Primary | ICD-10-CM

## 2022-10-11 RX ORDER — FUROSEMIDE 40 MG/1
40 TABLET ORAL DAILY
Qty: 30 TABLET | Refills: 5 | Status: SHIPPED | OUTPATIENT
Start: 2022-10-11 | End: 2023-01-03 | Stop reason: SDUPTHER

## 2022-10-11 NOTE — TELEPHONE ENCOUNTER
No new care gaps identified.  [facility] Embedded Care Gaps. Reference number: [id number]. 10/11/2022   3:23:58 PM CDT

## 2022-10-19 ENCOUNTER — OFFICE VISIT (OUTPATIENT)
Dept: GASTROENTEROLOGY | Facility: CLINIC | Age: 57
End: 2022-10-19
Payer: COMMERCIAL

## 2022-10-19 VITALS — HEIGHT: 61 IN | WEIGHT: 135.06 LBS | BODY MASS INDEX: 25.5 KG/M2

## 2022-10-19 DIAGNOSIS — K50.819 CROHN'S DISEASE OF SMALL AND LARGE INTESTINES WITH COMPLICATION: Primary | ICD-10-CM

## 2022-10-19 DIAGNOSIS — R10.13 ABDOMINAL PAIN, EPIGASTRIC: ICD-10-CM

## 2022-10-19 DIAGNOSIS — R10.10 PAIN OF UPPER ABDOMEN: ICD-10-CM

## 2022-10-19 DIAGNOSIS — Z79.899 HIGH RISK MEDICATION USE: ICD-10-CM

## 2022-10-19 PROCEDURE — 99999 PR PBB SHADOW E&M-EST. PATIENT-LVL III: ICD-10-PCS | Mod: PBBFAC,,, | Performed by: INTERNAL MEDICINE

## 2022-10-19 PROCEDURE — 99999 PR PBB SHADOW E&M-EST. PATIENT-LVL III: CPT | Mod: PBBFAC,,, | Performed by: INTERNAL MEDICINE

## 2022-10-19 PROCEDURE — 99204 OFFICE O/P NEW MOD 45 MIN: CPT | Mod: S$GLB,,, | Performed by: INTERNAL MEDICINE

## 2022-10-19 PROCEDURE — 99204 PR OFFICE/OUTPT VISIT, NEW, LEVL IV, 45-59 MIN: ICD-10-PCS | Mod: S$GLB,,, | Performed by: INTERNAL MEDICINE

## 2022-10-19 NOTE — PROGRESS NOTES
"Subjective:       Patient ID: Sana Braden is a 57 y.o. female.    Chief Complaint: Abdominal Pain and Crohn's Disease    Patient here today to establish care with aforementioned complaints.  She was previously seen by nurse practitioner Giuliano in 2013 in follow-up of Crohn's disease which at that time was being treated with Lialda.  Her most recent colonoscopy was performed in 2013 by Dr. Davis.  Exam was significant for an intrinsic stenosis found ileocecal valve, as well as ulceration in the terminal ileum and cecum.  She does have a colonoscopy scheduled tomorrow with Dr. Cerrato.    Patient reports history of Crohn's disease diagnosed over 15 years ago.  She does report history of surgery around that time while living in [place] however specifics are not evident at time of interview (right hemicolectomy anatomy noted on recent CT).  She also had cystic gyn lesion removed in 2013.  Most recent colonoscopy was performed by Dr. Null in 2019 exam was significant for small bowel ulceration as well as previously documented right hemicolectomy anatomy.    Today patient reports abdominal pain.  She denies significant or blood in her stool.  On the contrary she typically has difficulty passing stool and has noted a she in stool diameter.  Is currently "pencil thin".  She is currently not on any medications her Crohn's disease.  She has been on Lialda in the past as well as budesonide for short course in 2013.  She does recall that the budesonide did help with her symptoms.     CT Abd/P 7/2022  Impression:     Status post right colectomy without complicating features.  Nonspecific subcentimeter right lower quadrant mesenteric lymph nodes which may be reactive in nature.     Stable hepatic hypodensity compatible with cysts as seen on prior contrast enhanced study.       Past Medical History:   Diagnosis Date    Crohn's disease     Joint pain     Skin cancer     squamous cell on face    Squamous Cell Carcinoma 2010    " R nasolabial fold, excised in [place]       Past Surgical History:   Procedure Laterality Date    ABDOMINAL SURGERY      APPENDECTOMY      HYSTERECTOMY  2007    rso      rt rso  2003    large and small bowel resection due to being involved with ov mass.     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       Social History  Social History     Tobacco Use    Smoking status: Former     Packs/day: 0.25     Years: 20.00     Pack years: 5.00     Types: Cigarettes    Smokeless tobacco: Never    Tobacco comments:     2-3 cigg/day   Substance Use Topics    Alcohol use: Yes     Comment: occasionally, very infrequent    Drug use: No       Family History   Problem Relation Age of Onset    Heart disease Father     Diabetes Maternal Grandmother     Melanoma Neg Hx     Ovarian cancer Neg Hx     Uterine cancer Neg Hx     Breast cancer Neg Hx     Colon cancer Neg Hx        Review of Systems   Gastrointestinal:  Positive for abdominal distention, abdominal pain and constipation. Negative for blood in stool and diarrhea.         Objective:      Physical Exam  Constitutional:       Appearance: She is well-developed.   HENT:      Head: Normocephalic and atraumatic.   Eyes:      Conjunctiva/sclera: Conjunctivae normal.   Pulmonary:      Effort: Pulmonary effort is normal. No respiratory distress.   Musculoskeletal:      Cervical back: Normal range of motion.   Neurological:      Mental Status: She is alert and oriented to person, place, and time.   Psychiatric:         Behavior: Behavior normal.         Thought Content: Thought content normal.         Judgment: Judgment normal.         Pertinent labs and imaging studies reviewed    Assessment:       1. Crohn's disease of small and large intestines with complication    2. Pain of upper abdomen    3. Abdominal pain, epigastric          Plan:       Follow-up with upcoming colonoscopy   The patient will likely  require a biologic given her history of stricture disease and so will prepare for that, assuming she will not need a revision of her surgery  Send for pretreatment labs    (Portions of this note were dictated using voice recognition software and may contain dictation related errors in spelling/grammar/syntax not found on text review)

## 2022-10-31 ENCOUNTER — TELEPHONE (OUTPATIENT)
Dept: GASTROENTEROLOGY | Facility: CLINIC | Age: 57
End: 2022-10-31
Payer: COMMERCIAL

## 2022-10-31 NOTE — TELEPHONE ENCOUNTER
LVM for the patient to go get her labs drawn as soon as she can.      ----- Message from Bryson Evans MD sent at 10/30/2022 10:11 PM CDT -----  Regarding: Labs  Can you call her and remind her to have her labs drawn? Thanks

## 2022-12-01 ENCOUNTER — TELEPHONE (OUTPATIENT)
Dept: PRIMARY CARE CLINIC | Facility: CLINIC | Age: 57
End: 2022-12-01
Payer: COMMERCIAL

## 2022-12-01 NOTE — TELEPHONE ENCOUNTER
----- Message from Jill West sent at 12/1/2022  9:04 AM CST -----  Contact: Self/[phone]  Pt said that she is calling in regards to needing to get a return call from the nurse pt wants to know If the lab orders that Dr Vallecillo has in the system is the bloodwork that he wants her to have done. Please advise

## 2022-12-03 RX ORDER — ATORVASTATIN CALCIUM 10 MG/1
10 TABLET, FILM COATED ORAL DAILY
Qty: 90 TABLET | Refills: 3 | Status: SHIPPED | OUTPATIENT
Start: 2022-12-03 | End: 2023-12-03

## 2022-12-15 ENCOUNTER — TELEPHONE (OUTPATIENT)
Dept: SURGERY | Facility: CLINIC | Age: 57
End: 2022-12-15
Payer: COMMERCIAL

## 2022-12-15 NOTE — TELEPHONE ENCOUNTER
----- Message from Bryson Evans MD sent at 12/14/2022 10:07 AM CST -----  Labs look good. Will prescribe humira. Please sign up for ambassador program

## 2023-01-05 ENCOUNTER — TELEPHONE (OUTPATIENT)
Dept: GASTROENTEROLOGY | Facility: CLINIC | Age: 58
End: 2023-01-05
Payer: COMMERCIAL

## 2023-01-05 ENCOUNTER — PATIENT MESSAGE (OUTPATIENT)
Dept: PRIMARY CARE CLINIC | Facility: CLINIC | Age: 58
End: 2023-01-05
Payer: COMMERCIAL

## 2023-01-05 NOTE — TELEPHONE ENCOUNTER
----- Message from Viktoria Ross sent at 1/5/2023  1:32 PM CST -----  Type:  Patient  Call    Who Called:[place]   Would the patient rather a call back or a response via [facility]? Call back   Best Call Back Number:[phone]  Additional Information: missing pt insurance information

## 2023-01-05 NOTE — TELEPHONE ENCOUNTER
Called to give patient an call back to review her concerns patient did not answer wasn't able to leave voice message.

## 2023-01-06 ENCOUNTER — PATIENT MESSAGE (OUTPATIENT)
Dept: PRIMARY CARE CLINIC | Facility: CLINIC | Age: 58
End: 2023-01-06
Payer: COMMERCIAL

## 2023-01-09 ENCOUNTER — OFFICE VISIT (OUTPATIENT)
Dept: PRIMARY CARE CLINIC | Facility: CLINIC | Age: 58
End: 2023-01-09
Payer: COMMERCIAL

## 2023-01-09 ENCOUNTER — PATIENT MESSAGE (OUTPATIENT)
Dept: PRIMARY CARE CLINIC | Facility: CLINIC | Age: 58
End: 2023-01-09

## 2023-01-09 DIAGNOSIS — R60.0 PERIPHERAL EDEMA: Primary | ICD-10-CM

## 2023-01-09 DIAGNOSIS — E87.6 HYPOKALEMIA: ICD-10-CM

## 2023-01-09 DIAGNOSIS — N95.1 MENOPAUSAL SYNDROME: ICD-10-CM

## 2023-01-09 PROCEDURE — 99213 OFFICE O/P EST LOW 20 MIN: CPT | Mod: 95,,, | Performed by: FAMILY MEDICINE

## 2023-01-09 PROCEDURE — 99213 PR OFFICE/OUTPT VISIT, EST, LEVL III, 20-29 MIN: ICD-10-PCS | Mod: 95,,, | Performed by: FAMILY MEDICINE

## 2023-01-09 RX ORDER — FUROSEMIDE 40 MG/1
TABLET ORAL
Qty: 180 TABLET | Refills: 3 | Status: SHIPPED | OUTPATIENT
Start: 2023-01-09 | End: 2023-02-03 | Stop reason: SDUPTHER

## 2023-01-09 RX ORDER — POTASSIUM CHLORIDE 750 MG/1
CAPSULE, EXTENDED RELEASE ORAL
Qty: 180 CAPSULE | Refills: 3 | Status: SHIPPED | OUTPATIENT
Start: 2023-01-09

## 2023-01-09 NOTE — PROGRESS NOTES
Subjective:       Patient ID: Sana Braden is a 57 y.o. female.    Chief Complaint: No chief complaint on file.  History of present illness-- The patient location is:  Home  The chief complaint leading to consultation is:  Fluid retention hypokalemia    Visit type: audiovisual    Face to Face time with patient:  30 minutes  See history of present illness above minutes of total time spent on the encounter, which includes face to face time and non-face to face time preparing to see the patient (eg, review of tests), Obtaining and/or reviewing separately obtained history, Documenting clinical information in the electronic or other health record, Independently interpreting results (not separately reported) and communicating results to the patient/family/caregiver, or Care coordination (not separately reported).         Each patient to whom he or she provides medical services by telemedicine is:  (1) informed of the relationship between the physician and patient and the respective role of any other health care provider with respect to management of the patient; and (2) notified that he or she may decline to receive medical services by telemedicine and may withdraw from such care at any time.    Notes:  57-year-old white female retaining fluid----ongoing problems--took Lasix--was helping patient is on 40 mg per day.  Patient's watching making sure on low-sodium diet.  Patient is taking potassium with Lasix  Lab checked was done by  Who did colonoscopy had potassium 3.3     ROS:  Skin: no psoriasis, eczema, skin cancer  HEENT:  No headache   ,no  ocular pain, no  blurred vision, diplopia, epistaxis, hoarseness change in voice, thyroid trouble +glass  Lung: No pneumonia, asthma, Tb, wheezing,+SOB occas with abd swelling --no smoke  Heart: No chest pain, +ankle edema, palpitations, MI, phu murmur, hypertension, hyperlipidemia no stent bypass arrhythmia  Abdomen: no  nausea, no vomiting--no  diarrhea, constipation,  ulcers, hepatitis, gallbladder disease, melena, hematochezia, hematemesis +history Crohn's disease been doing pretty well  : no UTI, renal disease, stones history UTI's on off  GYN hyst, had mammogram few months ago no vaginal discharge  MS: no fractures, O/A, lupus, rheumatoid, gout  Neuro:no   dizziness, LOC, seizures    No diabetes, no anemia, + anxiety, + depression --better still on Zoloft  Single, no children, work [employer], Lives with sister     Objective:   Physical Exam:  No physical exam was done this was a virtual visit  General: Well nourished, well developed, no acute distress  Skin: No lesions  HEENT: Eyes PERRLA, EOM intact, nose clear discharge , throat nonerythematous ears TMs clear  NECK: Supple, no bruits, No JVD, no nodes  Lungs: Clear, no rales,  rhonchi or wheezes  Heart: Regular rate and rhythm, no murmurs, gallops, or rubs  Abdomen: scar entire midline abd --pt states occas left bulging --but occas entore upper abd bloated --tenderness especially in the right lower quad--had appendectomy--CT scan shows lymph node--patient may have adhesions from prior surgery  MS: Range of motion and muscle strength intact reflexes 2/4  Neuro: Alert, CN intact, oriented X 3 Romberg negative heel-toe intact  Extremities: No cyanosis, clubbing, or edema         Assessment:       1. Peripheral edema    2. Hypokalemia    3. Menopausal syndrome          Plan:       Peripheral edema    Hypokalemia    Menopausal syndrome    Other orders  -     furosemide (LASIX) 40 MG tablet; Lasix 1 1/2 --2 po q AM  Dispense: 180 tablet; Refill: 3  -     potassium chloride (MICRO-K) 10 MEQ CpSR; 1 p.o. b.i.d. x7 days then 1 p.o. with each Lasix  Dispense: 180 capsule; Refill: 3          Main Reason forvisit  Retaining fluid on Lasix 40 mg 1 in a.m. half in p.m. x3 days---then 40 in the morning q.a.m. and whenever necessary increase to 40 in the morning and 20 in the afternoon with Micro-K 10 b.i.d. x7 days then 1 p.o. with each  Lasix--redo BMP in 3 months--when swollen limit fluids to 33 oz per day  History partial colectomy due to colonic mass 2012  Abdominal pain epigastric with bowel movements burning sensation--much improved--omeprazole 40 mg 1 p.o. q.d.  Lab CBCs CMP lipids TSH--in 6 mo--BMP in 3 months to check potassium level  Health maintenance mammogram COVID

## 2023-02-03 DIAGNOSIS — R60.0 PERIPHERAL EDEMA: Primary | ICD-10-CM

## 2023-02-03 RX ORDER — FUROSEMIDE 40 MG/1
TABLET ORAL
Qty: 180 TABLET | Refills: 3 | Status: SHIPPED | OUTPATIENT
Start: 2023-02-03 | End: 2023-03-12

## 2023-07-07 RX ORDER — HYDROCHLOROTHIAZIDE 12.5 MG/1
TABLET ORAL
Qty: 90 TABLET | Refills: 3 | Status: SHIPPED | OUTPATIENT
Start: 2023-07-07 | End: 2023-08-15

## 2023-07-07 NOTE — TELEPHONE ENCOUNTER
Care Due:                  Date            Visit Type   Department     Provider  --------------------------------------------------------------------------------                                EP -                              PRIMARY      [facility]  Last Visit: 08-      CARE ([facility])   PRIMARY CARE   Kelton Vallecillo  Next Visit: None Scheduled  None         None Found                                                            Last  Test          Frequency    Reason                     Performed    Due Date  --------------------------------------------------------------------------------    Office Visit  12 months..  atorvastatin,              08-   08-                             hydroCHLOROthiazide,                             omeprazole, sertraline...    [employer] Embedded Care Due Messages. Reference number: [id number].   7/06/2023 11:39:51 PM CDT

## 2023-07-07 NOTE — TELEPHONE ENCOUNTER
Refill Routing Note   Medication(s) are not appropriate for processing by [facility] for the following reason(s):      Required labs abnormal    [facility] action(s):  Defer Care Due:  Appointment due          Appointments  past 12m or future 3m with PCP    Date Provider   Last Visit   1/9/2023 Kelton Vallecillo MD   Next Visit   Visit date not found Kelton Vallecillo MD   ED visits in past 90 days: 0        Note composed:7:55 AM 07/07/2023

## 2023-08-15 ENCOUNTER — OFFICE VISIT (OUTPATIENT)
Dept: PRIMARY CARE CLINIC | Facility: CLINIC | Age: 58
End: 2023-08-15
Payer: COMMERCIAL

## 2023-08-15 VITALS
OXYGEN SATURATION: 95 % | HEART RATE: 80 BPM | WEIGHT: 142.19 LBS | HEIGHT: 61 IN | BODY MASS INDEX: 26.85 KG/M2 | TEMPERATURE: 97 F | SYSTOLIC BLOOD PRESSURE: 100 MMHG | DIASTOLIC BLOOD PRESSURE: 60 MMHG | RESPIRATION RATE: 18 BRPM

## 2023-08-15 DIAGNOSIS — F41.9 ANXIETY: ICD-10-CM

## 2023-08-15 DIAGNOSIS — K50.90 CROHN'S DISEASE WITHOUT COMPLICATION, UNSPECIFIED GASTROINTESTINAL TRACT LOCATION: ICD-10-CM

## 2023-08-15 DIAGNOSIS — R10.9 ABDOMINAL PAIN, UNSPECIFIED ABDOMINAL LOCATION: Primary | ICD-10-CM

## 2023-08-15 DIAGNOSIS — Z13.1 SCREENING FOR DIABETES MELLITUS: ICD-10-CM

## 2023-08-15 DIAGNOSIS — Z90.49 HISTORY OF PARTIAL COLECTOMY: ICD-10-CM

## 2023-08-15 DIAGNOSIS — N95.1 MENOPAUSAL SYNDROME: ICD-10-CM

## 2023-08-15 DIAGNOSIS — R19.07 ABDOMINAL SWELLING, GENERALIZED: ICD-10-CM

## 2023-08-15 DIAGNOSIS — S66.911A MUSCLE STRAIN OF RIGHT WRIST, INITIAL ENCOUNTER: ICD-10-CM

## 2023-08-15 DIAGNOSIS — Z12.11 ENCOUNTER FOR SCREENING FOR MALIGNANT NEOPLASM OF COLON: ICD-10-CM

## 2023-08-15 DIAGNOSIS — R60.0 PERIPHERAL EDEMA: ICD-10-CM

## 2023-08-15 PROCEDURE — 99999 PR PBB SHADOW E&M-EST. PATIENT-LVL IV: ICD-10-PCS | Mod: PBBFAC,,, | Performed by: FAMILY MEDICINE

## 2023-08-15 PROCEDURE — 3008F PR BODY MASS INDEX (BMI) DOCUMENTED: ICD-10-PCS | Mod: CPTII,S$GLB,, | Performed by: FAMILY MEDICINE

## 2023-08-15 PROCEDURE — 3074F SYST BP LT 130 MM HG: CPT | Mod: CPTII,S$GLB,, | Performed by: FAMILY MEDICINE

## 2023-08-15 PROCEDURE — 1159F MED LIST DOCD IN RCRD: CPT | Mod: CPTII,S$GLB,, | Performed by: FAMILY MEDICINE

## 2023-08-15 PROCEDURE — 3008F BODY MASS INDEX DOCD: CPT | Mod: CPTII,S$GLB,, | Performed by: FAMILY MEDICINE

## 2023-08-15 PROCEDURE — 3074F PR MOST RECENT SYSTOLIC BLOOD PRESSURE < 130 MM HG: ICD-10-PCS | Mod: CPTII,S$GLB,, | Performed by: FAMILY MEDICINE

## 2023-08-15 PROCEDURE — 3078F DIAST BP <80 MM HG: CPT | Mod: CPTII,S$GLB,, | Performed by: FAMILY MEDICINE

## 2023-08-15 PROCEDURE — 99214 PR OFFICE/OUTPT VISIT, EST, LEVL IV, 30-39 MIN: ICD-10-PCS | Mod: S$GLB,,, | Performed by: FAMILY MEDICINE

## 2023-08-15 PROCEDURE — 1159F PR MEDICATION LIST DOCUMENTED IN MEDICAL RECORD: ICD-10-PCS | Mod: CPTII,S$GLB,, | Performed by: FAMILY MEDICINE

## 2023-08-15 PROCEDURE — 99999 PR PBB SHADOW E&M-EST. PATIENT-LVL IV: CPT | Mod: PBBFAC,,, | Performed by: FAMILY MEDICINE

## 2023-08-15 PROCEDURE — 3078F PR MOST RECENT DIASTOLIC BLOOD PRESSURE < 80 MM HG: ICD-10-PCS | Mod: CPTII,S$GLB,, | Performed by: FAMILY MEDICINE

## 2023-08-15 PROCEDURE — 99214 OFFICE O/P EST MOD 30 MIN: CPT | Mod: S$GLB,,, | Performed by: FAMILY MEDICINE

## 2023-08-15 RX ORDER — TRAMADOL HYDROCHLORIDE 50 MG/1
50 TABLET ORAL EVERY 6 HOURS PRN
Qty: 30 TABLET | Refills: 0 | Status: SHIPPED | OUTPATIENT
Start: 2023-08-15 | End: 2023-08-25

## 2023-08-15 NOTE — PROGRESS NOTES
Subjective:       Patient ID: Sana Braden is a 58 y.o. female.    Chief Complaint: Wrist Injury and Abdominal Pain  History of present illness-- 59 yo WF in for abdominal pain --??scare tissue --if patient does not take Lasix blows up.  Pain in the suprapubic and lower abdomen but bloating occurs in the epigastric area and makes it hard to breathe--saw GI MD --colonoscopy told WNL except for narrowing of the cold.  CT scan July 2022 showed a right colectomy with right quadrant lymph nodes and hepatic cyst.  Hx crohn's   If bloating and pain occur patient takes Lasix and problem seems to be resolved  +nausea no vomiting diarrhea--occasional constipation--no ulcers hepatitis gallbladder trouble--no melena hematochezia       Pt fell yesterday--tripped on uneven concrete--fell forward --used right hand to protect face.  Pain mainly in the right wrist area unable to flex extender invert or soila--unable to do hand grasp thumb index thumb 5th digit has some bruising anterior forearm---   ROS:  Skin: no psoriasis, eczema, skin cancer  HEENT:  No headache   ,no  ocular pain, no  blurred vision, diplopia, epistaxis, hoarseness change in voice, thyroid trouble +glass  Lung: No pneumonia, asthma, Tb, wheezing,+SOB occas with abd swelling --no smoke  Heart: No chest pain, +ankle edema, palpitations, MI, phu murmur, hypertension, hyperlipidemia no stent bypass arrhythmia  Abdomen: no  nausea, no vomiting--no  diarrhea, constipation, ulcers, hepatitis, gallbladder disease, melena, hematochezia, hematemesis +history Crohn's disease been doing pretty well CT scan showed right colectomy with right lower quadrant lymph nodes in hepatics cyst July 2022  : no UTI, renal disease, stones history UTI's on off  GYN hyst, had mammogram few months ago no vaginal discharge  MS: no fractures, O/A, lupus, rheumatoid, gout  Neuro:no   dizziness, LOC, seizures    No diabetes, no anemia, +no anxiety, no  depression --better still on  Zoloft  Single, no children, work [employer], Lives with sister     Objective:   Physical Exam:    General: Well nourished, well developed, no acute distress  Skin: No lesions  HEENT: Eyes PERRLA, EOM intact, nose clear discharge , throat nonerythematous ears TMs clear  NECK: Supple, no bruits, No JVD, no nodes  Lungs: Clear, no rales,  rhonchi or wheezes  Heart: Regular rate and rhythm, no murmurs, gallops, or rubs  Abdomen: scar entire midline abd --pt states occas left bulging --but occas entore upper abd bloated --tenderness especially in the right lower quad--but siri n generalized mild guarding no rebound   MS: Range of motion and muscle strength intact reflexes 2/4  Neuro: Alert, CN intact, oriented X 3 Romberg negative heel-toe intact  Extremities: No cyanosis, clubbing, or edema         Assessment:       1. Abdominal pain, unspecified abdominal location    2. Encounter for screening for malignant neoplasm of colon    3. Screening for diabetes mellitus    4. Abdominal swelling, generalized    5. History of partial colectomy    6. Crohn's disease without complication, unspecified gastrointestinal tract location    7. Anxiety    8. Menopausal syndrome    9. Peripheral edema    10. Muscle strain of right wrist, initial encounter          Plan:       Abdominal pain, unspecified abdominal location  -     CT Abdomen Pelvis  Without Contrast; Future; Expected date: 08/15/2023  -     CBC Auto Differential; Future; Expected date: 08/15/2023  -     Comprehensive Metabolic Panel; Future; Expected date: 08/15/2023  -     Sedimentation rate; Future; Expected date: 08/15/2023  -     LIPASE; Future; Expected date: 08/15/2023  -     Lipid Panel; Future; Expected date: 08/15/2023  -     TSH; Future; Expected date: 08/15/2023  -     T4, Free; Future; Expected date: 08/15/2023    Encounter for screening for malignant neoplasm of colon    Screening for diabetes mellitus  -     Hemoglobin A1C; Future; Expected date:  08/15/2023    Abdominal swelling, generalized    History of partial colectomy    Crohn's disease without complication, unspecified gastrointestinal tract location    Anxiety  -     TSH; Future; Expected date: 08/15/2023  -     T4, Free; Future; Expected date: 08/15/2023    Menopausal syndrome    Peripheral edema    Muscle strain of right wrist, initial encounter  -     X-Ray Wrist Complete 3 views Right; Future; Expected date: 08/15/2023    Other orders  -     traMADoL (ULTRAM) 50 mg tablet; Take 1 tablet (50 mg total) by mouth every 6 (six) hours as needed.  Dispense: 30 tablet; Refill: 0          Main Reason forvisit  Abdominal pain suprapubic area and lower abdomen specially right lower quadrant with bloating in the epigastric area history of Crohn's disease old CT scan July 2022 showed right colectomy with right lower quadrant lymph nodes in hepatic cyst--patient with basically generalized abdominal pain--saw GI mg told was normal--scan of the abdomen and pelvis--needs to see exploratory lap or GI MD. 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

## 2023-08-16 ENCOUNTER — TELEPHONE (OUTPATIENT)
Dept: PRIMARY CARE CLINIC | Facility: CLINIC | Age: 58
End: 2023-08-16
Payer: COMMERCIAL

## 2023-08-16 NOTE — TELEPHONE ENCOUNTER
Called patient regarding providers request. No answer left a detail message informing the patient that someone will reach out to her for scheduling.

## 2023-08-17 ENCOUNTER — TELEPHONE (OUTPATIENT)
Dept: PRIMARY CARE CLINIC | Facility: CLINIC | Age: 58
End: 2023-08-17
Payer: COMMERCIAL

## 2023-08-17 DIAGNOSIS — S66.911A MUSCLE STRAIN OF RIGHT WRIST, INITIAL ENCOUNTER: Primary | ICD-10-CM

## 2023-08-17 NOTE — TELEPHONE ENCOUNTER
Patient called asking about appointment for ortho. Can you please sign the order for this patient. I know you said on Wednesday to get patient in for Wrist. The patient needed a referral to Ortho ASAP.

## 2023-08-22 DIAGNOSIS — T14.8XXA FX: Primary | ICD-10-CM

## 2023-08-23 ENCOUNTER — TELEPHONE (OUTPATIENT)
Dept: PRIMARY CARE CLINIC | Facility: CLINIC | Age: 58
End: 2023-08-23
Payer: COMMERCIAL

## 2023-08-23 NOTE — TELEPHONE ENCOUNTER
I called patient and informed her on 08/17/2023 that someone will be reaching out to her. Patient verbalized understand.   Immediate Brief Procedure Note    Patient: Nelsy Magana    Pre-op Diagnosis: thyroid nodules    Post-op Diagnosis: same    Procedure: Left thyroid FNA    Proceduralist: Keegan James MD    Assistants: na    Anesthesia Staff: Anesthesia staff cannot be found from this context.    Anesthesia Type: local    Findings: successful FNA biopsy of Left nodules #1 &4    Estimated Blood Loss: none    Complications: none    Specimens Removed: FNA

## 2023-08-23 NOTE — TELEPHONE ENCOUNTER
----- Message from Tomeka Schroeder MA sent at 8/17/2023  4:33 PM CDT -----  Contact: [phone]    ----- Message -----  From: Ban Barrow  Sent: 8/17/2023   1:14 PM CDT  To: Avel Melara Staff    Patient called, stated that she was told by xray that someone will be calling her in regards taking the cast out of her, but so far has not receive a call back. Please call and advise. Thank you.

## 2023-08-24 ENCOUNTER — OFFICE VISIT (OUTPATIENT)
Dept: ORTHOPEDICS | Facility: CLINIC | Age: 58
End: 2023-08-24
Payer: COMMERCIAL

## 2023-08-24 ENCOUNTER — HOSPITAL ENCOUNTER (OUTPATIENT)
Dept: RADIOLOGY | Facility: OTHER | Age: 58
Discharge: HOME | End: 2023-08-24
Attending: PHYSICIAN ASSISTANT
Payer: COMMERCIAL

## 2023-08-24 VITALS — WEIGHT: 142.19 LBS | BODY MASS INDEX: 26.85 KG/M2 | HEIGHT: 61 IN

## 2023-08-24 DIAGNOSIS — T14.8XXA FX: ICD-10-CM

## 2023-08-24 DIAGNOSIS — S52.501A CLOSED FRACTURE OF DISTAL END OF RIGHT RADIUS, UNSPECIFIED FRACTURE MORPHOLOGY, INITIAL ENCOUNTER: ICD-10-CM

## 2023-08-24 PROCEDURE — 1159F MED LIST DOCD IN RCRD: CPT | Mod: CPTII,S$GLB,, | Performed by: PHYSICIAN ASSISTANT

## 2023-08-24 PROCEDURE — 73110 X-RAY EXAM OF WRIST: CPT | Mod: TC,FY,RT

## 2023-08-24 PROCEDURE — 99999 PR PBB SHADOW E&M-EST. PATIENT-LVL III: CPT | Mod: PBBFAC,,, | Performed by: PHYSICIAN ASSISTANT

## 2023-08-24 PROCEDURE — 99204 PR OFFICE/OUTPT VISIT, NEW, LEVL IV, 45-59 MIN: ICD-10-PCS | Mod: 25,S$GLB,, | Performed by: PHYSICIAN ASSISTANT

## 2023-08-24 PROCEDURE — 99204 OFFICE O/P NEW MOD 45 MIN: CPT | Mod: 25,S$GLB,, | Performed by: PHYSICIAN ASSISTANT

## 2023-08-24 PROCEDURE — 73110 XR WRIST COMPLETE 3 VIEWS RIGHT: ICD-10-PCS | Mod: 26,RT,, | Performed by: RADIOLOGY

## 2023-08-24 PROCEDURE — 73110 X-RAY EXAM OF WRIST: CPT | Mod: 26,RT,, | Performed by: RADIOLOGY

## 2023-08-24 PROCEDURE — 1159F PR MEDICATION LIST DOCUMENTED IN MEDICAL RECORD: ICD-10-PCS | Mod: CPTII,S$GLB,, | Performed by: PHYSICIAN ASSISTANT

## 2023-08-24 PROCEDURE — 29125 APPL SHORT ARM SPLINT STATIC: CPT | Mod: RT,S$GLB,, | Performed by: PHYSICIAN ASSISTANT

## 2023-08-24 PROCEDURE — 3008F BODY MASS INDEX DOCD: CPT | Mod: CPTII,S$GLB,, | Performed by: PHYSICIAN ASSISTANT

## 2023-08-24 PROCEDURE — 29125 PR APPLY FOREARM SPLINT,STATIC: ICD-10-PCS | Mod: RT,S$GLB,, | Performed by: PHYSICIAN ASSISTANT

## 2023-08-24 PROCEDURE — 99999 PR PBB SHADOW E&M-EST. PATIENT-LVL III: ICD-10-PCS | Mod: PBBFAC,,, | Performed by: PHYSICIAN ASSISTANT

## 2023-08-24 PROCEDURE — 3008F PR BODY MASS INDEX (BMI) DOCUMENTED: ICD-10-PCS | Mod: CPTII,S$GLB,, | Performed by: PHYSICIAN ASSISTANT

## 2023-08-24 NOTE — PROGRESS NOTES
Subjective:      Patient ID: Sana Braden is a 58 y.o. female.    Chief Complaint: Pain of the Right Hand      HPI  Sana Braden is a right hand dominant 58 y.o. female presenting today for evaluation of the right wrist. She reports a fall 8/14/23 onto the R wrist. Xrays obtained by primary care revealed a right radial styloid fracture. She has been using a wrist brace. Pain is intermittent, managed with Aleve as needed. Denies numbness.       Review of patient's allergies indicates:   Allergen Reactions    Adhesive Dermatitis     blisters         Current Outpatient Medications   Medication Sig Dispense Refill    atorvastatin (LIPITOR) 10 MG tablet Take 1 tablet (10 mg total) by mouth once daily. 90 tablet 3    gabapentin (NEURONTIN) 100 MG capsule TAKE 1 CAPSULE BY MOUTH THREE TIMES A  capsule 0    ondansetron (ZOFRAN) 4 MG tablet TAKE 2 TABLETS (8 MG TOTAL) BY MOUTH EVERY 6 (SIX) HOURS AS NEEDED FOR NAUSEA. 30 tablet 2    ondansetron (ZOFRAN-ODT) 4 MG TbDL TAKE 1 TABLET BY MOUTH EVERY 6 HOURS AS NEEDED 30 tablet 5    potassium chloride (MICRO-K) 10 MEQ CpSR 1 p.o. b.i.d. x7 days then 1 p.o. with each Lasix 180 capsule 3    sertraline (ZOLOFT) 100 MG tablet TAKE 1 TABLET BY MOUTH EVERY DAY 90 tablet 3    traMADoL (ULTRAM) 50 mg tablet Take 1 tablet (50 mg total) by mouth every 6 (six) hours as needed. 30 tablet 0    adalimumab (HUMIRA,CF, PEN) 40 mg/0.4 mL PnKt Inject 0.4 mLs (40 mg total) into the skin every 14 (fourteen) days. (Patient not taking: Reported on 8/15/2023) 2 pen 2    omeprazole (PRILOSEC) 40 MG capsule Take 1 capsule (40 mg total) by mouth once daily. 30 capsule 5     No current facility-administered medications for this visit.       Past Medical History:   Diagnosis Date    Crohn's disease     Joint pain     Skin cancer     squamous cell on face    Squamous Cell Carcinoma 2010    R nasolabial fold, excised in [place]       Past Surgical History:   Procedure Laterality Date    ABDOMINAL  "SURGERY      APPENDECTOMY      COLONOSCOPY  10/20/2022    COLONOSCOPY N/A 10/20/2022    Procedure: COLONOSCOPY;  Surgeon: Dennis Cerrato MD;  Location: [facility];  Service: General;  Laterality: N/A;    HYSTERECTOMY  2007    rso      rt rso  2003    large and small bowel resection due to being involved with ov mass.     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   Respiratory: Negative for cough and shortness of breath.    Gastrointestinal: Negative for nausea and vomiting.   Skin: Negative for rash.   Neurological: Negative for dizziness and headaches.   Psychiatric/Behavioral: Negative for depression.   MSK as in HPI       OBJECTIVE:     PHYSICAL EXAM:  Ht 5' 1" (1.549 m)   Wt 64.5 kg (142 lb 3.2 oz)   LMP  (LMP Unknown)   BMI 26.87 kg/m²     GEN:  NAD, well-developed, well-groomed.  NEURO: Awake, alert, and oriented. Normal attention and concentration.    PSYCH: Normal mood and affect. Behavior is normal.  HEENT: No cervical lymphadenopathy noted.  CARDIOVASCULAR: Radial pulses 2+ bilaterally. No LE edema noted.  PULMONARY: Breath sounds normal. No respiratory distress.  SKIN: Intact, no rashes.      MSK:   RUE:  Good finger motion. Wrist motion not assessed due to known fracture. She has moderate edema and ecchymosis of the wrist, hand. Ttp at the known fracture site of the distal radius. AIN/PIN/Radial/Median/Ulnar Nerves assessed in isolation without deficit. Radial & Ulnar arteries palpated 2+. Capillary Refill <3s.    RADIOGRAPHS:  R wrist 8/24/23   FINDINGS:  Fracture through the radial styloid remains nondisplaced.  I see no new fracture.  Mild surrounding soft tissue edema.   Impression:   Please see above.   Comments: I have personally reviewed the imaging and I agree with the above radiologist's report.    ASSESSMENT/PLAN:       ICD-10-CM " ICD-9-CM   1. Closed fracture of distal end of right radius, unspecified fracture morphology, initial encounter  S52.501A 813.42       Orders Placed This Encounter    CT Wrist Without Contrast Right     Plan:   Plan for R thumb spica orthoglass fiberglass splint today full time use no weight bearing   Will obtain CT to assess possible volar lip fracture fragment   RTC follownig above         The patient indicates understanding of these issues and agrees to the plan.    Dionna Hill PA-C  [facility]   [facility] [place]

## 2023-08-28 ENCOUNTER — TELEPHONE (OUTPATIENT)
Dept: ORTHOPEDICS | Facility: CLINIC | Age: 58
End: 2023-08-28
Payer: COMMERCIAL

## 2023-08-28 NOTE — TELEPHONE ENCOUNTER
LVM for pt. Informing that due to canceled imagining appointment we will cancel f/u c Jin to review those results. Requested a call back to the [facility] at [phone] with any questions, concerns or need for a different appointment time.

## 2023-09-18 ENCOUNTER — PATIENT MESSAGE (OUTPATIENT)
Dept: PRIMARY CARE CLINIC | Facility: CLINIC | Age: 58
End: 2023-09-18
Payer: COMMERCIAL

## 2023-09-19 ENCOUNTER — OFFICE VISIT (OUTPATIENT)
Dept: DERMATOLOGY | Facility: CLINIC | Age: 58
End: 2023-09-19
Payer: COMMERCIAL

## 2023-09-19 DIAGNOSIS — L72.0 EPIDERMAL CYST: Primary | ICD-10-CM

## 2023-09-19 DIAGNOSIS — L90.5 SCAR: ICD-10-CM

## 2023-09-19 PROCEDURE — 99999 PR PBB SHADOW E&M-EST. PATIENT-LVL III: ICD-10-PCS | Mod: PBBFAC,,, | Performed by: DERMATOLOGY

## 2023-09-19 PROCEDURE — 1160F RVW MEDS BY RX/DR IN RCRD: CPT | Mod: CPTII,S$GLB,, | Performed by: DERMATOLOGY

## 2023-09-19 PROCEDURE — 1160F PR REVIEW ALL MEDS BY PRESCRIBER/CLIN PHARMACIST DOCUMENTED: ICD-10-PCS | Mod: CPTII,S$GLB,, | Performed by: DERMATOLOGY

## 2023-09-19 PROCEDURE — 1159F MED LIST DOCD IN RCRD: CPT | Mod: CPTII,S$GLB,, | Performed by: DERMATOLOGY

## 2023-09-19 PROCEDURE — 1159F PR MEDICATION LIST DOCUMENTED IN MEDICAL RECORD: ICD-10-PCS | Mod: CPTII,S$GLB,, | Performed by: DERMATOLOGY

## 2023-09-19 PROCEDURE — 99999 PR PBB SHADOW E&M-EST. PATIENT-LVL III: CPT | Mod: PBBFAC,,, | Performed by: DERMATOLOGY

## 2023-09-19 PROCEDURE — 99212 OFFICE O/P EST SF 10 MIN: CPT | Mod: S$GLB,,, | Performed by: DERMATOLOGY

## 2023-09-19 PROCEDURE — 99212 PR OFFICE/OUTPT VISIT, EST, LEVL II, 10-19 MIN: ICD-10-PCS | Mod: S$GLB,,, | Performed by: DERMATOLOGY

## 2023-09-19 NOTE — PROGRESS NOTES
Subjective:      Patient ID:  Sana Braden is a 58 y.o. female who presents for   Chief Complaint   Patient presents with    Spot     L cheek     Spot - Initial  Affected locations: left cheek  Duration: 5 years  Signs / symptoms: oozing, non-healing and scaling  Severity: mild to moderate  Timing: constant  Aggravated by: nothing  Relieving factors/Treatments tried: nothing  Improvement on treatment: no relief        Review of Systems   Constitutional:  Negative for fever, chills and fatigue.   HENT:  Negative for congestion and sore throat.    Respiratory:  Negative for cough and shortness of breath.    Gastrointestinal:  Negative for nausea and vomiting.   Skin:  Positive for activity-related sunscreen use and wears hat. Negative for daily sunscreen use and recent sunburn.   Hematologic/Lymphatic: Does not bruise/bleed easily.       Objective:   Physical Exam   Constitutional: She appears well-developed and well-nourished. No distress.   Neurological: She is alert and oriented to person, place, and time. She is not disoriented.   Psychiatric: She has a normal mood and affect.   Skin:               Diagram Legend     Erythematous scaling macule/papule c/w actinic keratosis       Vascular papule c/w angioma      Pigmented verrucoid papule/plaque c/w seborrheic keratosis      Yellow umbilicated papule c/w sebaceous hyperplasia      Irregularly shaped tan macule c/w lentigo     1-2 mm smooth white papules consistent with Milia      Movable subcutaneous cyst with punctum c/w epidermal inclusion cyst      Subcutaneous movable cyst c/w pilar cyst      Firm pink to brown papule c/w dermatofibroma      Pedunculated fleshy papule(s) c/w skin tag(s)      Evenly pigmented macule c/w junctional nevus     Mildly variegated pigmented, slightly irregular-bordered macule c/w mildly atypical nevus      Flesh colored to evenly pigmented papule c/w intradermal nevus       Pink pearly papule/plaque c/w basal cell carcinoma       Erythematous hyperkeratotic cursted plaque c/w SCC      Surgical scar with no sign of skin cancer recurrence      Open and closed comedones      Inflammatory papules and pustules      Verrucoid papule consistent consistent with wart     Erythematous eczematous patches and plaques     Dystrophic onycholytic nail with subungual debris c/w onychomycosis     Umbilicated papule    Erythematous-base heme-crusted tan verrucoid plaque consistent with inflamed seborrheic keratosis     Erythematous Silvery Scaling Plaque c/w Psoriasis     See annotation            Assessment / Plan:        Epidermal cyst  Focal.  Discussed with patient the likelihood that this lesion is an epidermal cyst caused by an involuted lining of skin with dead skin trapped inside.  Cysts do not have a malignant potential but can become infected and turn into abscesses with possible cellulitis.  Discussed the option of warm compresses if infected with oral antibiotics.  Discussed the option of surgical excision with scar, possible recurrence, hematoma, and infection.  Patient to consider these options.  Offered trial of il roids.  Pt wants exc.  Decision made for minor surgery today after discussing with the patient.  Patient agrees.  Discussed with patient the risks of procedure or surgery, including scar, ulceration, recurrence, skin discoloration, and infection.  Patient not to have alcohol, ibuprofen, nsaids day of and night before procedure.  No swimming pool or ocean water for one week after procedure.  No heavy exertion in general or physical activity that would stretch the surgical site for one week after the procedure.    Scar  Long term scar discussed.  Sp sccis mohs.           Follow up if symptoms worsen or fail to improve, for Procedure.

## 2023-09-22 DIAGNOSIS — F41.9 ANXIETY: ICD-10-CM

## 2023-09-22 RX ORDER — SERTRALINE HYDROCHLORIDE 100 MG/1
TABLET, FILM COATED ORAL
Qty: 90 TABLET | Refills: 3 | Status: SHIPPED | OUTPATIENT
Start: 2023-09-22

## 2023-09-22 NOTE — TELEPHONE ENCOUNTER
Provider Staff:  Action required for this patient     Please see care gap opportunities below in Care Due Message.    Thanks!  [facility]     Appointments      Date Provider   Last Visit   8/15/2023 Kelton Vallecillo MD   Next Visit   Visit date not found Kelton Vallecillo MD     Refill Decision Note   Sana Braden  is requesting a refill authorization.  Brief Assessment and Rationale for Refill:  Approve     Medication Therapy Plan:         Comments:     Note composed:8:22 AM 09/22/2023

## 2023-09-22 NOTE — TELEPHONE ENCOUNTER
Care Due:                  Date            Visit Type   Department     Provider  --------------------------------------------------------------------------------                                EP -                              PRIMARY      [facility]  Last Visit: 08-      CARE ([facility])   PRIMARY CARE   Kelton Vallecillo  Next Visit: None Scheduled  None         None Found                                                            Last  Test          Frequency    Reason                     Performed    Due Date  --------------------------------------------------------------------------------    CMP.........  12 months..  atorvastatin.............  12- 11-    Lipid Panel.  12 months..  atorvastatin.............  12- 11-    Health [facility] Embedded Care Due Messages. Reference number: [id number].   9/22/2023 12:22:14 AM CDT

## 2023-09-28 ENCOUNTER — PROCEDURE VISIT (OUTPATIENT)
Dept: DERMATOLOGY | Facility: CLINIC | Age: 58
End: 2023-09-28
Payer: COMMERCIAL

## 2023-09-28 ENCOUNTER — PATIENT MESSAGE (OUTPATIENT)
Dept: PRIMARY CARE CLINIC | Facility: CLINIC | Age: 58
End: 2023-09-28
Payer: COMMERCIAL

## 2023-09-28 DIAGNOSIS — L72.0 EPIDERMAL CYST: Primary | ICD-10-CM

## 2023-09-28 PROCEDURE — 88304 TISSUE EXAM BY PATHOLOGIST: CPT | Performed by: PATHOLOGY

## 2023-09-28 PROCEDURE — 13131 PR RECMPL WND HEAD,FAC,HAND 1.1-2.5 CM: ICD-10-PCS | Mod: S$GLB,,, | Performed by: DERMATOLOGY

## 2023-09-28 PROCEDURE — 11442 PR EXC SKIN BENIG 1.1-2 CM FACE,FACIAL: ICD-10-PCS | Mod: 51,S$GLB,, | Performed by: DERMATOLOGY

## 2023-09-28 PROCEDURE — 11442 EXC FACE-MM B9+MARG 1.1-2 CM: CPT | Mod: 51,S$GLB,, | Performed by: DERMATOLOGY

## 2023-09-28 PROCEDURE — 13131 CMPLX RPR F/C/C/M/N/AX/G/H/F: CPT | Mod: S$GLB,,, | Performed by: DERMATOLOGY

## 2023-09-28 PROCEDURE — 88304 PR  SURG PATH,LEVEL III: ICD-10-PCS | Mod: 26,,, | Performed by: PATHOLOGY

## 2023-09-28 PROCEDURE — 88304 TISSUE EXAM BY PATHOLOGIST: CPT | Mod: 26,,, | Performed by: PATHOLOGY

## 2023-09-28 NOTE — PROGRESS NOTES
PROCEDURE: Elliptical excision with complex layered repair in order to decrease dead space.    ANESTHETIC: 2 cc 1% Xylocaine with Epinephrine 1:100,000, buffered    SURGEON:  Elijah Maldonado M.D.    ASSISTANTS: Nadine Reaves LPN    PREOPERATIVE DIAGNOSIS:  cyst    POSTOPERATIVE DIAGNOSIS: Same as preoperative diagnosis    PATHOLOGIC DIAGNOSIS: Pending    LOCATION: l cheek    INITIAL LESION SIZE: 1.2 cm    EXCISED DIAMETER: 1.2 cm    PREPARATION: The diagnosis, procedure, alternatives, benefits and risks, including but not limited to: infection, bleeding/bruising, drug reactions, pain, scar or cosmetic defect, local sensation disturbances, wound dehiscence (separation of wound edges after sutures removed) and/or recurrence of present condition were explained to the patient. The patient elected to proceed.  Patient's identity was verified and the site was verified.    PROCEDURE: The location noted above was prepped, draped, and anesthetized in the usual sterile fashion per Nadine Reaves LPN. Lesional tissue was carefully marked with at least 0 mm margins of clinically normal skin in all directions. A fusiform elliptical excision was done with #15 blade carried down completely through the dermis into the deep subcutaneous tissues to the level of the non-muscle fascia, and dissection was carried out in that plane. The wound was undermined to a distance at least the maximum width of the defect as measured perpendicular to the closure line along at least one entire edge of the defect, in the case 1 cm. Electrocoagulation was used to obtain hemostasis. Blood loss was minimal. The wound was then approximated in a layered fashion with subcutaneous and intradermal sutures of 4.0 Monocryl, approximately 3 in number, and the wound was then superficially closed with simple interrupted sutures of 4.0 Prolene.   Absorbable sutures used for outer sutures.       The patient tolerated the procedure well.    The area was cleaned and  dressed appropriately and the patient was given wound care instructions, as well as an appointment for follow-up evaluation.    LENGTH OF REPAIR: 1.8 cm

## 2023-09-28 NOTE — PATIENT INSTRUCTIONS
Post- Operative Wound Care    Your doctor has performed local skin surgery today.  Vaseline ointment and a pressure bandage were placed after the surgery.  It is very important that you keep this bandage in place for 24 hours.  This will decrease the risk of post - operative infection and bleeding.  After 24 hours, you may remove the band aid and wash the area with warm soap and water and apply Vaseline ointment.  Many patients prefer to use Neosporin or Bacitracin ointment.  This is acceptable; however know that you can develop an allergy to this medication even if you have used it safely for years.  It is important to keep the area moist.  Letting it dry out and get air slows healing time, will worsen the scar, and make it more difficult to remove the stitches.  Band aid is optional after first 24 hours.    It is best NOT to use hydrogen peroxide or antibacterial soap to wash the operative site.       If you notice increasing redness, tenderness, pain, or yellow drainage at the biopsy or surgical site, please notify your doctor.  These are signs of an infection.    If your biopsy/surgical site is bleeding, apply firm pressure for 15 minutes straight.  Repeat for another 15 minutes, if it is still bleeding.   If the surgical site continues to bleed, then please contact your doctor.      For [facility] users:   You will receive your pathology results in [facility] as soon as they are available. Please be assured that your physician/provider will review your results and contact you should additional treatment be required. This is one more way [employer] is putting you first.       [facility] [place] [address]/ [phone] FAX/ [email or web address]

## 2023-10-02 LAB
FINAL PATHOLOGIC DIAGNOSIS: NORMAL
GROSS: NORMAL
Lab: NORMAL
MICROSCOPIC EXAM: NORMAL

## 2023-10-02 RX ORDER — FUROSEMIDE 40 MG/1
40 TABLET ORAL 2 TIMES DAILY
Qty: 180 TABLET | Refills: 3 | Status: SHIPPED | OUTPATIENT
Start: 2023-10-02 | End: 2023-11-15 | Stop reason: SDUPTHER

## 2023-10-05 RX ORDER — FUROSEMIDE 40 MG/1
40 TABLET ORAL 2 TIMES DAILY
Qty: 180 TABLET | Refills: 3 | OUTPATIENT
Start: 2023-10-05 | End: 2024-10-04

## 2023-10-05 NOTE — TELEPHONE ENCOUNTER
Refill Decision Note   Sana Braden  is requesting a refill authorization.  Brief Assessment and Rationale for Refill:  Quick Discontinue     Medication Therapy Plan:  DUPLICATE      Comments:     Note composed:9:35 AM 10/05/2023             Appointments     Last Visit   8/15/2023 Kelton Vallecillo MD   Next Visit   Visit date not found Kelton Vallecillo MD           Appointments     Last Visit   8/15/2023 Kelton Vallecillo MD   Next Visit   Visit date not found Kelton Vallecillo MD

## 2023-10-05 NOTE — TELEPHONE ENCOUNTER
No care due was identified.  Health [facility] Embedded Care Due Messages. Reference number: [id number].   10/05/2023 9:20:17 AM CDT

## 2023-10-10 ENCOUNTER — PATIENT MESSAGE (OUTPATIENT)
Dept: ADMINISTRATIVE | Facility: HOSPITAL | Age: 58
End: 2023-10-10
Payer: COMMERCIAL

## 2023-10-10 ENCOUNTER — PATIENT MESSAGE (OUTPATIENT)
Dept: DERMATOLOGY | Facility: CLINIC | Age: 58
End: 2023-10-10
Payer: COMMERCIAL

## 2023-10-11 ENCOUNTER — PATIENT OUTREACH (OUTPATIENT)
Dept: ADMINISTRATIVE | Facility: HOSPITAL | Age: 58
End: 2023-10-11
Payer: COMMERCIAL

## 2023-10-11 ENCOUNTER — PATIENT MESSAGE (OUTPATIENT)
Dept: SURGERY | Facility: CLINIC | Age: 58
End: 2023-10-11
Payer: COMMERCIAL

## 2023-10-11 ENCOUNTER — TELEPHONE (OUTPATIENT)
Dept: SURGERY | Facility: CLINIC | Age: 58
End: 2023-10-11
Payer: COMMERCIAL

## 2023-10-11 DIAGNOSIS — Z12.11 SCREENING FOR COLON CANCER: Primary | ICD-10-CM

## 2023-10-11 DIAGNOSIS — L01.00 IMPETIGO: Primary | ICD-10-CM

## 2023-10-11 RX ORDER — SODIUM, POTASSIUM,MAG SULFATES 17.5-3.13G
1 SOLUTION, RECONSTITUTED, ORAL ORAL DAILY
Qty: 1 KIT | Refills: 0 | Status: SHIPPED | OUTPATIENT
Start: 2023-10-11 | End: 2023-10-13

## 2023-10-11 RX ORDER — MUPIROCIN 20 MG/G
OINTMENT TOPICAL 3 TIMES DAILY
Qty: 30 G | Refills: 2 | Status: SHIPPED | OUTPATIENT
Start: 2023-10-11

## 2023-10-11 NOTE — TELEPHONE ENCOUNTER
The patient has been advised the Colonoscopy Prep Kit will be ordered from the patient's verified preferred pharmacy on file. The medication can  be picked up by the patient, or the patient's designated representative.The patient was further explained the Colonoscopy Prep instructions will be mailed to the patient verified mailing address on file, or put onto the [employer] portal, whichever method of delivery the patient prefers.Additionally this patient was informed,not to follow the instructions that comes with the bowel prep medication. However, the patient was instructed to please follow the Colonoscopy Bowel Prep instructions that's being provided by the . The patient was asked to please to follow the Colonoscopy Prep instructions being provided as precisely,and  meticulously as possible.The patient was advised you  will receive a follow up phone call to summarize the Colonoscopy Prep instructions prior to the scheduled Colonoscopy procedure date. At this time the patient will be given an opportunity to ask any questions regarding the Colonoscopy procedure, and it's associated Bowel Prep instructions.

## 2023-10-11 NOTE — PROGRESS NOTES
Health Maintenance Due   Topic Date Due    TETANUS VACCINE  Never done    Hemoglobin A1c (Diabetic Prevention Screening)  Never done    Shingles Vaccine (1 of 2) Never done    Colorectal Cancer Screening  04/20/2023    Influenza Vaccine (1) Never done    COVID-19 Vaccine (2 - 2023-24 season) 09/01/2023     Immunizations - reviewed and updated   Care Everywhere - triggered   Care Teams - updated   Outreach - Portal message sent to patient in regards to scheduling mammogram and colorectal cancer screening. Case request for colonoscopy placed.

## 2023-10-12 ENCOUNTER — PATIENT MESSAGE (OUTPATIENT)
Dept: DERMATOLOGY | Facility: CLINIC | Age: 58
End: 2023-10-12
Payer: COMMERCIAL

## 2023-10-15 ENCOUNTER — PATIENT MESSAGE (OUTPATIENT)
Dept: PRIMARY CARE CLINIC | Facility: CLINIC | Age: 58
End: 2023-10-15
Payer: COMMERCIAL

## 2023-10-16 NOTE — TELEPHONE ENCOUNTER
Patient message in stating that the insurance company asking to send in a 90 day supply of medication instead of 30 of the Sertraline. Can you please fill medication in 90 day supply for now on. Since insurance suggest the change.

## 2023-10-18 ENCOUNTER — PATIENT MESSAGE (OUTPATIENT)
Dept: CARDIOLOGY | Facility: CLINIC | Age: 58
End: 2023-10-18
Payer: COMMERCIAL

## 2023-11-08 ENCOUNTER — TELEPHONE (OUTPATIENT)
Dept: GASTROENTEROLOGY | Facility: CLINIC | Age: 58
End: 2023-11-08
Payer: COMMERCIAL

## 2023-11-15 ENCOUNTER — PATIENT MESSAGE (OUTPATIENT)
Dept: PRIMARY CARE CLINIC | Facility: CLINIC | Age: 58
End: 2023-11-15
Payer: COMMERCIAL

## 2023-11-15 RX ORDER — FUROSEMIDE 40 MG/1
40 TABLET ORAL 2 TIMES DAILY
Qty: 180 TABLET | Refills: 3 | OUTPATIENT
Start: 2023-11-15 | End: 2024-02-10

## 2023-11-15 NOTE — TELEPHONE ENCOUNTER
No care due was identified.  [facility] Embedded Care Due Messages. Reference number: [id number].   11/15/2023 11:14:26 AM CST

## 2023-11-15 NOTE — TELEPHONE ENCOUNTER
Patient needs her prescription for Lasix printed out so she can bring it to the pharmacy. Pending RX to be printed

## 2024-02-07 NOTE — TELEPHONE ENCOUNTER
Refill Routing Note   Medication(s) are not appropriate for processing by [facility] for the following reason(s):        Required labs outdated    [facility] action(s):  Defer     Requires labs : Yes             Appointments  past 12m or future 3m with PCP    Date Provider   Last Visit   8/15/2023 Kelton Vallecillo MD   Next Visit   Visit date not found Kelton Vallecillo MD   ED visits in past 90 days: 0        Note composed:3:29 PM 02/07/2024

## 2024-02-07 NOTE — TELEPHONE ENCOUNTER
Care Due:                  Date            Visit Type   Department     Provider  --------------------------------------------------------------------------------                                EP -                              PRIMARY      [facility]  Last Visit: 08-      CARE ([facility])   PRIMARY CARE   Kelton Vallecillo  Next Visit: None Scheduled  None         None Found                                                            Last  Test          Frequency    Reason                     Performed    Due Date  --------------------------------------------------------------------------------    CMP.........  12 months..  atorvastatin, furosemide.  12- 11-    Lipid Panel.  12 months..  atorvastatin.............  12- 11-    Health [facility] Embedded Care Due Messages. Reference number: [id number].   2/07/2024 2:49:12 PM CST

## 2024-02-10 RX ORDER — FUROSEMIDE 40 MG/1
40 TABLET ORAL 2 TIMES DAILY
Qty: 180 TABLET | Refills: 0 | Status: SHIPPED | OUTPATIENT
Start: 2024-02-10 | End: 2024-05-03 | Stop reason: SDUPTHER

## 2024-04-19 ENCOUNTER — TELEPHONE (OUTPATIENT)
Dept: PULMONOLOGY | Facility: CLINIC | Age: 59
End: 2024-04-19
Payer: COMMERCIAL

## 2024-05-03 ENCOUNTER — PATIENT MESSAGE (OUTPATIENT)
Dept: PRIMARY CARE CLINIC | Facility: CLINIC | Age: 59
End: 2024-05-03
Payer: COMMERCIAL

## 2024-05-03 NOTE — TELEPHONE ENCOUNTER
Care Due:                  Date            Visit Type   Department     Provider  --------------------------------------------------------------------------------                                EP -                              PRIMARY      [facility]  Last Visit: 08-      CARE ([facility])   PRIMARY CARE   Kelton Vallecillo  Next Visit: None Scheduled  None         None Found                                                            Last  Test          Frequency    Reason                     Performed    Due Date  --------------------------------------------------------------------------------    CMP.........  12 months..  atorvastatin, furosemide.  12- 11-    Lipid Panel.  12 months..  atorvastatin.............  12- 11-    Health [facility] Embedded Care Due Messages. Reference number: [id number].   5/03/2024 11:28:47 AM CDT

## 2024-05-03 NOTE — TELEPHONE ENCOUNTER
Refill Routing Note   Medication(s) are not appropriate for processing by [facility] for the following reason(s):        Required labs outdated    [facility] action(s):  Defer     Requires labs : Yes      Medication Therapy Plan: note from duplicate encounter confirms pt wants rx sent to [facility]      Appointments  past 12m or future 3m with PCP    Date Provider   Last Visit   8/15/2023 Kelton Vallecillo MD   Next Visit   Visit date not found Kelton Vallecillo MD   ED visits in past 90 days: 0        Note composed:3:46 PM 05/03/2024

## 2024-05-03 NOTE — TELEPHONE ENCOUNTER
Patient states that she put in the wrong pharmacy for her refill. Pending Lasix to correct pharmacy.

## 2024-05-05 RX ORDER — FUROSEMIDE 40 MG/1
40 TABLET ORAL 2 TIMES DAILY
Qty: 180 TABLET | Refills: 0 | Status: SHIPPED | OUTPATIENT
Start: 2024-05-05

## 2024-07-27 NOTE — TELEPHONE ENCOUNTER
Care Due:                  Date            Visit Type   Department     Provider  --------------------------------------------------------------------------------                                EP -                              PRIMARY      [facility]  Last Visit: 08-      CARE ([facility])   PRIMARY CARE   Kelton Vallecillo  Next Visit: None Scheduled  None         None Found                                                            Last  Test          Frequency    Reason                     Performed    Due Date  --------------------------------------------------------------------------------    CMP.........  12 months..  atorvastatin, furosemide.  12- 11-    Lipid Panel.  12 months..  atorvastatin.............  12- 11-    Health [facility] Embedded Care Due Messages. Reference number: [id number].   7/27/2024 12:12:17 PM CDT

## 2024-07-29 RX ORDER — FUROSEMIDE 40 MG/1
40 TABLET ORAL 2 TIMES DAILY
Qty: 180 TABLET | Refills: 0 | Status: SHIPPED | OUTPATIENT
Start: 2024-07-29

## 2024-07-29 NOTE — TELEPHONE ENCOUNTER
Refill Routing Note   Medication(s) are not appropriate for processing by [facility] for the following reason(s):        Required labs outdated    [facility] action(s):  Defer   Requires labs : Yes             Appointments  past 12m or future 3m with PCP    Date Provider   Last Visit   8/15/2023 Kelton Vallecillo MD   Next Visit   Visit date not found Kelton Vallecillo MD   ED visits in past 90 days: 0        Note composed:10:47 AM 07/29/2024

## 2024-08-05 DIAGNOSIS — F41.9 ANXIETY: ICD-10-CM

## 2024-08-05 RX ORDER — SERTRALINE HYDROCHLORIDE 100 MG/1
TABLET, FILM COATED ORAL
Qty: 90 TABLET | Refills: 0 | Status: SHIPPED | OUTPATIENT
Start: 2024-08-05

## 2024-08-21 DIAGNOSIS — Z12.31 OTHER SCREENING MAMMOGRAM: ICD-10-CM

## 2024-08-22 ENCOUNTER — TELEPHONE (OUTPATIENT)
Dept: PRIMARY CARE CLINIC | Facility: CLINIC | Age: 59
End: 2024-08-22
Payer: COMMERCIAL

## 2024-08-22 NOTE — TELEPHONE ENCOUNTER
Called and spoke with the patient about her virtual visit not being scheduled for her virtual visit on 08/21/2024. She has been scheduled for this upcoming Monday.

## 2024-08-26 RX ORDER — ONDANSETRON 4 MG/1
4 TABLET, ORALLY DISINTEGRATING ORAL EVERY 6 HOURS PRN
Qty: 30 TABLET | Refills: 2 | Status: SHIPPED | OUTPATIENT
Start: 2024-08-26

## 2024-08-26 NOTE — TELEPHONE ENCOUNTER
Care Due:                  Date            Visit Type   Department     Provider  --------------------------------------------------------------------------------                                EP -                              PRIMARY      [facility]  Last Visit: 08-      CARE ([facility])   PRIMARY CARE   Kelton Vallecillo  Next Visit: None Scheduled  None         None Found                                                            Last  Test          Frequency    Reason                     Performed    Due Date  --------------------------------------------------------------------------------    Office Visit  15 months..  atorvastatin, furosemide,   08-   11-                             sertraline...............    [facility] [employer] Embedded Care Due Messages. Reference number: [id number].   8/26/2024 2:18:27 PM CDT

## 2024-10-14 ENCOUNTER — OFFICE VISIT (OUTPATIENT)
Dept: PRIMARY CARE CLINIC | Facility: CLINIC | Age: 59
End: 2024-10-14
Payer: COMMERCIAL

## 2024-10-14 VITALS
SYSTOLIC BLOOD PRESSURE: 126 MMHG | WEIGHT: 134.94 LBS | BODY MASS INDEX: 25.48 KG/M2 | RESPIRATION RATE: 18 BRPM | DIASTOLIC BLOOD PRESSURE: 86 MMHG | OXYGEN SATURATION: 96 % | HEIGHT: 61 IN | HEART RATE: 62 BPM

## 2024-10-14 DIAGNOSIS — Z13.1 SCREENING FOR DIABETES MELLITUS: ICD-10-CM

## 2024-10-14 DIAGNOSIS — R10.13 ABDOMINAL PAIN, EPIGASTRIC: ICD-10-CM

## 2024-10-14 DIAGNOSIS — Z90.710 HISTORY OF HYSTERECTOMY: ICD-10-CM

## 2024-10-14 DIAGNOSIS — R10.13 EPIGASTRIC PAIN: Primary | ICD-10-CM

## 2024-10-14 DIAGNOSIS — Z90.79 HISTORY OF LEFT SALPINGO-OOPHORECTOMY: ICD-10-CM

## 2024-10-14 DIAGNOSIS — Z90.721 HISTORY OF LEFT SALPINGO-OOPHORECTOMY: ICD-10-CM

## 2024-10-14 DIAGNOSIS — R60.0 PERIPHERAL EDEMA: ICD-10-CM

## 2024-10-14 DIAGNOSIS — Z12.11 ENCOUNTER FOR SCREENING FOR MALIGNANT NEOPLASM OF COLON: ICD-10-CM

## 2024-10-14 DIAGNOSIS — Z90.49 HISTORY OF PARTIAL COLECTOMY: ICD-10-CM

## 2024-10-14 DIAGNOSIS — K50.90 CROHN'S DISEASE WITHOUT COMPLICATION, UNSPECIFIED GASTROINTESTINAL TRACT LOCATION: ICD-10-CM

## 2024-10-14 PROCEDURE — 3074F SYST BP LT 130 MM HG: CPT | Mod: CPTII,S$GLB,, | Performed by: FAMILY MEDICINE

## 2024-10-14 PROCEDURE — 99214 OFFICE O/P EST MOD 30 MIN: CPT | Mod: S$GLB,,, | Performed by: FAMILY MEDICINE

## 2024-10-14 PROCEDURE — 3079F DIAST BP 80-89 MM HG: CPT | Mod: CPTII,S$GLB,, | Performed by: FAMILY MEDICINE

## 2024-10-14 PROCEDURE — 99999 PR PBB SHADOW E&M-EST. PATIENT-LVL IV: CPT | Mod: PBBFAC,,, | Performed by: FAMILY MEDICINE

## 2024-10-14 PROCEDURE — 1159F MED LIST DOCD IN RCRD: CPT | Mod: CPTII,S$GLB,, | Performed by: FAMILY MEDICINE

## 2024-10-14 PROCEDURE — 3008F BODY MASS INDEX DOCD: CPT | Mod: CPTII,S$GLB,, | Performed by: FAMILY MEDICINE

## 2024-10-14 RX ORDER — FUROSEMIDE 40 MG/1
40 TABLET ORAL 2 TIMES DAILY
Qty: 180 TABLET | Refills: 1 | Status: SHIPPED | OUTPATIENT
Start: 2024-10-14

## 2024-10-14 RX ORDER — ONDANSETRON 4 MG/1
4 TABLET, ORALLY DISINTEGRATING ORAL EVERY 6 HOURS PRN
Qty: 30 TABLET | Refills: 5 | Status: SHIPPED | OUTPATIENT
Start: 2024-10-14

## 2024-10-14 NOTE — PROGRESS NOTES
Subjective:       Patient ID: Sana Braden is a 59 y.o. female.    Chief Complaint: Medication Refill  History of present illness-- 58 yo WF --sore right upper quadrant ----watching what she eats --had colonoscopy that was normal did not have a EGD--occasional some difficulty swallowing---has very sensitive gag reflex even swallow pill occasionally feels like going to vomit    CT scan July 2022 showed a right colectomy with right quadrant lymph nodes and hepatic cyst.  Hx crohn's   +nausea--+vomiting--occasional diarrhea--more constipation---no ulcers hepatitis gallbladder disease--has had some bright red blood with a bowel movement in the past no melena or hematemesis  History appendectomy/hysterectomy/abdominal surgery in his for Crohn's disease/exploratory laparotomy for left salpingo-oophorectomy and resection of right pelvic mass was a hydrosalpinx     ROS:  Skin: no psoriasis, eczema, skin cancer  HEENT:  No headache   ,no  ocular pain, no  blurred vision, diplopia, epistaxis, hoarseness change in voice, thyroid trouble +glass  Lung: No pneumonia, asthma, Tb, wheezing,+SOB occas with abd swelling --no smoke  Heart: No chest pain, no ankle edema, palpitations, MI, phu murmur, hypertension, hyperlipidemia no stent bypass arrhythmia  Abdomen: +  nausea, + vomiting--no  diarrhea,+ constipation,no  ulcers, hepatitis, gallbladder disease, melena, hematochezia, hematemesis +history Crohn's disease been doing pretty well CT scan showed right colectomy with right lower quadrant lymph nodes in hepatics cyst July 2022  : no UTI, renal disease, stones history UTI's on off  GYN hyst, left salpingo-oophorectomy with resection of a pelvic mass a hydrosalpinx  MS: no fractures, O/A, lupus, rheumatoid, gout  Neuro:no   dizziness, LOC, seizures    No diabetes, no anemia, no anxiety, no  depression --better still on Zoloft  Single, no children, work [employer], Lives with sister     Objective:   Physical Exam:     General: Well nourished, well developed, no acute distress  Skin: No lesions  HEENT: Eyes PERRLA, EOM intact, nose clear discharge , throat nonerythematous ears TMs clear  NECK: Supple, no bruits, No JVD, no nodes  Lungs: Clear, no rales,  rhonchi or wheezes  Heart: Regular rate and rhythm, no murmurs, gallops, or rubs  Abdomen: scar entire midline abd --pt states occas left bulging --tenderness difficult for patient to localize occasionally says right upper quadrant near costal angle occasionally right periumbilical area occasionally left lower quadrant some dysphagia  MS: Range of motion and muscle strength intact reflexes 2/4  Neuro: Alert, CN intact, oriented X 3 Romberg negative heel-toe intact  Extremities: No cyanosis, clubbing, or edema         Assessment:       1. Epigastric pain    2. Encounter for screening for malignant neoplasm of colon    3. Screening for diabetes mellitus    4. Abdominal pain, epigastric    5. Crohn's disease without complication, unspecified gastrointestinal tract location    6. History of partial colectomy    7. Peripheral edema    8. History of hysterectomy    9. History of left salpingo-oophorectomy          Plan:       Epigastric pain  -     CT Abdomen Pelvis W Wo Contrast; Future; Expected date: 10/14/2024    Encounter for screening for malignant neoplasm of colon  -     Case Request Endoscopy: COLONOSCOPY    Screening for diabetes mellitus  -     HEMOGLOBIN A1C; Future; Expected date: 10/14/2024    Abdominal pain, epigastric  -     CBC Auto Differential; Future; Expected date: 10/14/2024  -     Comprehensive Metabolic Panel; Future; Expected date: 10/14/2024  -     Lipid Panel; Future; Expected date: 10/14/2024  -     T4, Free; Future; Expected date: 10/14/2024  -     TSH; Future; Expected date: 10/14/2024  -     Ambulatory referral/consult to Gastroenterology; Future; Expected date: 10/21/2024    Crohn's disease without complication, unspecified gastrointestinal tract  location  -     LIPASE; Future; Expected date: 10/14/2024  -     Ambulatory referral/consult to Gastroenterology; Future; Expected date: 10/21/2024    History of partial colectomy    Peripheral edema    History of hysterectomy    History of left salpingo-oophorectomy    Other orders  -     furosemide (LASIX) 40 MG tablet; Take 1 tablet (40 mg total) by mouth 2 (two) times daily.  Dispense: 180 tablet; Refill: 1  -     ondansetron (ZOFRAN-ODT) 4 MG TbDL; Take 1 tablet (4 mg total) by mouth every 6 (six) hours as needed.  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

## 2024-10-15 ENCOUNTER — PATIENT MESSAGE (OUTPATIENT)
Dept: PRIMARY CARE CLINIC | Facility: CLINIC | Age: 59
End: 2024-10-15
Payer: COMMERCIAL

## 2024-10-16 RX ORDER — FUROSEMIDE 40 MG/1
40 TABLET ORAL 2 TIMES DAILY
Qty: 180 TABLET | Refills: 3 | Status: SHIPPED | OUTPATIENT
Start: 2024-10-16

## 2024-10-16 NOTE — TELEPHONE ENCOUNTER
Care Due:                  Date            Visit Type   Department     Provider  --------------------------------------------------------------------------------                                MYCHART                              FOLLOWUP/OF  [facility] MICHAELLESSTEVE  Last Visit: 10-      FICE VISIT   PRIMARY CARE   [facility] Val[facility]illo                               [facility]  Next Visit: 01-      CARE ([facility])   PRIMARY CARE   Kelton Vallecillo                                                            Last  Test          Frequency    Reason                     Performed    Due Date  --------------------------------------------------------------------------------    CMP.........  12 months..  atorvastatin, furosemide.  12- 11-    Lipid Panel.  12 months..  atorvastatin.............  12- 11-    [facility] Embedded Care Due Messages. Reference number: [id number].   10/16/2024 8:42:28 AM CDT

## 2024-11-10 DIAGNOSIS — F41.9 ANXIETY: ICD-10-CM

## 2024-11-10 RX ORDER — SERTRALINE HYDROCHLORIDE 100 MG/1
100 TABLET, FILM COATED ORAL DAILY
Qty: 90 TABLET | Refills: 3 | Status: SHIPPED | OUTPATIENT
Start: 2024-11-10

## 2024-11-10 NOTE — TELEPHONE ENCOUNTER
Refill Decision Note   Sana Braden  is requesting a refill authorization.  Brief Assessment and Rationale for Refill:  Approve     Medication Therapy Plan:         Comments:     Note composed:10:03 AM 11/10/2024

## 2024-11-10 NOTE — TELEPHONE ENCOUNTER
No care due was identified.  [facility] Embedded Care Due Messages. Reference number: [id number].   11/10/2024 7:13:27 AM CST

## 2024-12-13 ENCOUNTER — TELEPHONE (OUTPATIENT)
Dept: GASTROENTEROLOGY | Facility: CLINIC | Age: 59
End: 2024-12-13
Payer: COMMERCIAL

## 2024-12-13 NOTE — TELEPHONE ENCOUNTER
Attempted to schedule pt for colonoscopy, no answer. 1 attempt to schedule pt. Follow up reminder created to call pt back in approx. 1 week

## 2024-12-30 ENCOUNTER — OFFICE VISIT (OUTPATIENT)
Dept: PRIMARY CARE CLINIC | Facility: CLINIC | Age: 59
End: 2024-12-30
Payer: COMMERCIAL

## 2024-12-30 DIAGNOSIS — F41.9 ANXIETY: ICD-10-CM

## 2024-12-30 DIAGNOSIS — N95.1 MENOPAUSAL SYNDROME: Primary | ICD-10-CM

## 2024-12-30 DIAGNOSIS — Z90.710 HISTORY OF HYSTERECTOMY: ICD-10-CM

## 2024-12-30 DIAGNOSIS — Z90.49 HISTORY OF PARTIAL COLECTOMY: ICD-10-CM

## 2024-12-30 DIAGNOSIS — K50.00 CROHN'S DISEASE OF SMALL INTESTINE WITHOUT COMPLICATION: ICD-10-CM

## 2024-12-30 DIAGNOSIS — R60.0 PERIPHERAL EDEMA: ICD-10-CM

## 2024-12-30 PROCEDURE — 99213 OFFICE O/P EST LOW 20 MIN: CPT | Mod: 95,,, | Performed by: FAMILY MEDICINE

## 2024-12-30 RX ORDER — FUROSEMIDE 40 MG/1
TABLET ORAL
Qty: 180 TABLET | Refills: 3 | Status: SHIPPED | OUTPATIENT
Start: 2024-12-30

## 2024-12-30 RX ORDER — POTASSIUM CHLORIDE 750 MG/1
CAPSULE, EXTENDED RELEASE ORAL
Qty: 180 CAPSULE | Refills: 3 | Status: SHIPPED | OUTPATIENT
Start: 2024-12-30

## 2024-12-31 NOTE — PROGRESS NOTES
Subjective:       Patient ID: Sana Braden is a 59 y.o. female.    Chief Complaint: No chief complaint on file.  History of present illness-- Virtual Video Telehealth Visit     The patient location is:  Home  The chief complaint leading to consultation is:  Peripheral edema abdominal swelling Crohn's disease restless leg syndrome  Visit type: Virtual visit  Total time spent with patient:  20 minutes     Each patient to whom I provide medical services by telemedicine is:  (1) informed of the relationship between the physician and patient and the respective role of any other health care provider with respect to management of the patient; and (2) notified that they may decline to receive medical services by telemedicine and may withdraw from such care at any time. Patient verbally consented to receive this service via voice-only telephone call.       HPI:  See history of present illness above     Assessment and plan:  See plan below 59-year-old white female in for swelling---took fluid pills --legs on abdominal area swelling a lot--if does not take the fluid pills then swelling affects patient's breathing  Patient on Lasix 40 mg b.i.d.--feels needs to take 40 t.i.d. or 80 in the morning and 20 in the afternoon--patient has completely stopped cokes--occasional iced tea mainly water  Patient is trying to limit fluids to a 1000 cc per day--elevating the feet 20 minutes in the morning 20 in the afternoon  History Crohn's disease but doing fine  GERD on Prilosec  History restless leg on gabapentin  Depression on Zoloft  Hyperlipidemia on atorvastatin  Occasional nausea vomiting on Zofran             ROS:  Skin: no psoriasis, eczema, skin cancer  HEENT:  No headache   ,no  ocular pain, no  blurred vision, diplopia, epistaxis, hoarseness change in voice, thyroid trouble +glass  Lung: No pneumonia, asthma, Tb, wheezing,+SOB occas with abd swelling --no smoke  Heart: No chest pain, no ankle edema, palpitations, MI, phu  murmur, hypertension, hyperlipidemia no stent bypass arrhythmia  Abdomen: no  nausea, no vomiting--no  diarrhea,+ constipation,no  ulcers, hepatitis, gallbladder disease, melena, hematochezia, hematemesis +history Crohn's disease been doing pretty well CT scan showed right colectomy with right lower quadrant lymph nodes in hepatics cyst July 2022  : no UTI, renal disease, stones history UTI's on off  GYN hyst, left salpingo-oophorectomy with resection of a pelvic mass a hydrosalpinx  MS: no fractures, O/A, lupus, rheumatoid, gout  Neuro:no   dizziness, LOC, seizures    No diabetes, no anemia, no anxiety, no  depression --better still on Zoloft  Single, no children, work [employer], Lives with sister     Objective:   Physical Exam:  No physical exam was done this was a virtual visit  General: Well nourished, well developed, no acute distress  Skin: No lesions  HEENT: Eyes PERRLA, EOM intact, nose clear discharge , throat nonerythematous ears TMs clear  NECK: Supple, no bruits, No JVD, no nodes  Lungs: Clear, no rales,  rhonchi or wheezes  Heart: Regular rate and rhythm, no murmurs, gallops, or rubs  Abdomen: scar entire midline abd --pt states occas left bulging --tenderness difficult for patient to localize occasionally says right upper quadrant near costal angle occasionally right periumbilical area occasionally left lower quadrant some dysphagia  MS: Range of motion and muscle strength intact reflexes 2/4  Neuro: Alert, CN intact, oriented X 3 Romberg negative heel-toe intact  Extremities: No cyanosis, clubbing, or edema         Assessment:       1. Menopausal syndrome    2. History of partial colectomy    3. History of hysterectomy    4. Crohn's disease of small intestine without complication    5. Anxiety    6. Peripheral edema            Plan:       Menopausal syndrome    History of partial colectomy    History of hysterectomy    Crohn's disease of small intestine without complication    Anxiety    Peripheral  edema  -     Ambulatory referral/consult to Nephrology; Future; Expected date: 01/06/2025    Other orders  -     furosemide (LASIX) 40 MG tablet; 2 po q AM x 3-7 days once edema down take 1 po bid  Dispense: 180 tablet; Refill: 3  -     potassium chloride (MICRO-K) 10 MEQ CpSR; One p.o. with each Lasix  Dispense: 180 capsule; Refill: 3            Main Reason forvisit  Peripheral edema--abdominal swelling--causing shortness of breaths--on Lasix 40 mg b.i.d.---told to increase to Lasix 80 in the morning 40 in the afternoon for 3-7 days once edema is down go back to 40 b.i.d.---needs do lab as in computer--see Dr. Irizarry for renal function----low-sodium diet---limit fluids to a 1000 cc per day---elevate the feet 20 minutes in the morning 20 afternoon above the heart---way self daily--be should take potassium with Lasix  Hyperlipidemia on atorvastatin  History GERD on omeprazole  History Crohn's disease---occasional nausea and vomiting--take Zofran as needed--not having these problems at this time  Occasional gabapentin for chronic pain and restless leg syndrome  Depression on Zoloft  History partial colectomy due to colonic mass 2012  Lab CBCs CMP l lipids and hemoglobin A1c  T4 TSH  Return 3 months

## 2025-01-08 ENCOUNTER — TELEPHONE (OUTPATIENT)
Dept: PRIMARY CARE CLINIC | Facility: CLINIC | Age: 60
End: 2025-01-08
Payer: COMMERCIAL

## 2025-01-08 DIAGNOSIS — D64.9 ANEMIA, UNSPECIFIED TYPE: Primary | ICD-10-CM

## 2025-01-08 DIAGNOSIS — E87.6 HYPOKALEMIA: ICD-10-CM

## 2025-01-08 NOTE — TELEPHONE ENCOUNTER
Received a critical lab call for this patient from Taylor Khan MA, LT, RN and she said that the patient potassium is 2.3. I went to Dr. Vallecillo and informed him of the critical lab and he said to inform the patient to double her potassium for 3 days and he will change her potassium orders when he look at her labs. Patient was contacted at 3:31 pm and informed of the critical lab as well as to double her potassium for 3 days. Patient verbalized understanding.       Patient is taking Potassium Chloride once a day in the morning.

## 2025-01-09 PROBLEM — D64.9 ANEMIA, UNSPECIFIED: Status: ACTIVE | Noted: 2025-01-09

## 2025-01-31 ENCOUNTER — PATIENT MESSAGE (OUTPATIENT)
Dept: GASTROENTEROLOGY | Facility: CLINIC | Age: 60
End: 2025-01-31
Payer: COMMERCIAL

## 2025-01-31 ENCOUNTER — TELEPHONE (OUTPATIENT)
Dept: GASTROENTEROLOGY | Facility: CLINIC | Age: 60
End: 2025-01-31
Payer: COMMERCIAL

## 2025-03-19 ENCOUNTER — OFFICE VISIT (OUTPATIENT)
Dept: PRIMARY CARE CLINIC | Facility: CLINIC | Age: 60
End: 2025-03-19
Payer: COMMERCIAL

## 2025-03-19 DIAGNOSIS — E87.6 HYPOKALEMIA: ICD-10-CM

## 2025-03-19 DIAGNOSIS — F41.9 ANXIETY: ICD-10-CM

## 2025-03-19 DIAGNOSIS — J01.00 ACUTE NON-RECURRENT MAXILLARY SINUSITIS: ICD-10-CM

## 2025-03-19 DIAGNOSIS — K50.00 CROHN'S DISEASE OF SMALL INTESTINE WITHOUT COMPLICATION: ICD-10-CM

## 2025-03-19 DIAGNOSIS — R60.0 PERIPHERAL EDEMA: ICD-10-CM

## 2025-03-19 DIAGNOSIS — D64.9 ANEMIA, UNSPECIFIED TYPE: Primary | ICD-10-CM

## 2025-03-19 DIAGNOSIS — J40 BRONCHITIS: ICD-10-CM

## 2025-03-19 RX ORDER — PROMETHAZINE HYDROCHLORIDE AND DEXTROMETHORPHAN HYDROBROMIDE 6.25; 15 MG/5ML; MG/5ML
5 SYRUP ORAL EVERY 6 HOURS PRN
Qty: 180 ML | Refills: 1 | Status: SHIPPED | OUTPATIENT
Start: 2025-03-19

## 2025-03-19 RX ORDER — AZITHROMYCIN 250 MG/1
TABLET, FILM COATED ORAL
Qty: 6 TABLET | Refills: 0 | Status: SHIPPED | OUTPATIENT
Start: 2025-03-19 | End: 2025-03-23

## 2025-03-19 RX ORDER — PREDNISONE 20 MG/1
20 TABLET ORAL DAILY
Qty: 7 TABLET | Refills: 0 | Status: SHIPPED | OUTPATIENT
Start: 2025-03-19 | End: 2025-03-26

## 2025-03-19 NOTE — PROGRESS NOTES
Subjective:       Patient ID: Sana Braden is a 60 y.o. female.    Chief Complaint: No chief complaint on file.  History of present illness-Virtual Video Telehealth Visit     The patient location is:  Home  The chief complaint leading to consultation is:  Sinusitis bronchitis anemia hypokalemia  Visit type: Virtual visit  Total time spent with patient:  15 minutes     Each patient to whom I provide medical services by telemedicine is:  (1) informed of the relationship between the physician and patient and the respective role of any other health care provider with respect to management of the patient; and (2) notified that they may decline to receive medical services by telemedicine and may withdraw from such care at any time. Patient verbally consented to receive this service via voice-only telephone call.       HPI:  See history of present illness above     Assessment and plan:  See plan below -60 year-old white female congestion in the chest---Sick x3 days--+fever low grade--had body aches---+runny stuffy nose---+sore throat cough---+cough---+phlegm green---yellow---no pneumonia asthma TB---no wheezing----nausea loose bowel movements--no vomiting--no known flu or COVID exposure  Works at [employer]  Anemia hematocrit 32--needs anemia workup B12 folic acid serum iron total iron binding capacity stool guaiacs CBCs  Hypokalemia potassium 2.3 patient on potassium needs magnesium recheck CMP  Patient on Lasix 40 mg b.i.d.--feels needs to take 40 t.i.d. or 80 in the morning and 20 in the afternoon--patient has completely stopped cokes--occasional iced tea mainly water  Patient is trying to limit fluids to a 1000 cc per day--elevating the feet 20 minutes in the morning 20 in the afternoon  History Crohn's disease but doing fine  GERD on Prilosec  History restless leg on gabapentin  Depression on Zoloft  Hyperlipidemia on atorvastatin  Occasional nausea vomiting on Zofran             ROS:  No significant change except  for history of present illness  Skin: no psoriasis, eczema, skin cancer  HEENT:  No headache   ,no  ocular pain, no  blurred vision, diplopia, epistaxis, hoarseness change in voice, thyroid trouble +glass  Lung: No pneumonia, asthma, Tb, wheezing,+SOB occas with abd swelling --no smoke  Heart: No chest pain, no ankle edema, palpitations, MI, phu murmur, hypertension, hyperlipidemia no stent bypass arrhythmia  Abdomen: no  nausea, no vomiting--no  diarrhea,+ constipation,no  ulcers, hepatitis, gallbladder disease, melena, hematochezia, hematemesis +history Crohn's disease been doing pretty well CT scan showed right colectomy with right lower quadrant lymph nodes in hepatics cyst July 2022  : no UTI, renal disease, stones history UTI's on off  GYN hyst, left salpingo-oophorectomy with resection of a pelvic mass a hydrosalpinx  MS: no fractures, O/A, lupus, rheumatoid, gout  Neuro:no   dizziness, LOC, seizures    No diabetes, no anemia, no anxiety, no  depression --better still on Zoloft  Single, no children, work [employer], Lives with sister     Objective:   Physical Exam:  No physical exam was done this was a virtual visit  General: Well nourished, well developed, no acute distress  Skin: No lesions  HEENT: Eyes PERRLA, EOM intact, nose clear discharge , throat nonerythematous ears TMs clear  NECK: Supple, no bruits, No JVD, no nodes  Lungs: Clear, no rales,  rhonchi or wheezes  Heart: Regular rate and rhythm, no murmurs, gallops, or rubs  Abdomen: scar entire midline abd --pt states occas left bulging --tenderness difficult for patient to localize occasionally says right upper quadrant near costal angle occasionally right periumbilical area occasionally left lower quadrant some dysphagia  MS: Range of motion and muscle strength intact reflexes 2/4  Neuro: Alert, CN intact, oriented X 3 Romberg negative heel-toe intact  Extremities: No cyanosis, clubbing, or edema         Assessment:       1. Anemia, unspecified  type              Plan:       Anemia, unspecified type  -     CBC Auto Differential; Future; Expected date: 03/19/2025  -     Comprehensive Metabolic Panel; Future; Expected date: 03/19/2025    Other orders  -     azithromycin (Z-INDIA) 250 MG tablet; 2 tabs by mouth day 1, then 1 tab by mouth daily x 4 days  Dispense: 6 tablet; Refill: 0  -     promethazine-dextromethorphan (PROMETHAZINE-DM) 6.25-15 mg/5 mL Syrp; Take 5 mLs by mouth every 6 (six) hours as needed (cough).  Dispense: 180 mL; Refill: 1  -     predniSONE (DELTASONE) 20 MG tablet; Take 1 tablet (20 mg total) by mouth once daily. for 7 days  Dispense: 7 tablet; Refill: 0              Main Reason forvisit  Sinusitis/bronchitis----Z-India--prednisone 20 mg 1 p.o. q.d. x7 days--Phenergan DM 1 tsp q.6h p.r.n. cough  Hypokalemia patient to continue potassium needs to do magnesium and redo CMP  Anemia hematocrit 32--needs to do serum iron total iron binding capacity B12 folic acid stool for blood and repeat CBCs  Other medical issues  Peripheral edema--abdominal swelling--causing shortness of breaths--on Lasix 40 mg b.i.d.---told to increase to Lasix 80 in the morning 40 in the afternoon for 3-7 days once edema is down go back to 40 b.i.d.---needs do lab as in computer--see Dr. Irizarry for renal function----low-sodium diet---limit fluids to a 1000 cc per day---elevate the feet 20 minutes in the morning 20 afternoon above the heart---way self daily--be should take potassium with Lasix  Hyperlipidemia on atorvastatin  History GERD on omeprazole  History Crohn's disease---occasional nausea and vomiting--take Zofran as needed--not having these problems at this time  Occasional gabapentin for chronic pain and restless leg syndrome  Depression on Zoloft  History partial colectomy due to colonic mass 2012  Return 3 months

## 2025-04-25 NOTE — PROGRESS NOTES
Karla in lab called with a critical lab value of K+ of 2.6. Inform NELLY Medrano NP. NP inform me to call pt to ensure she is taking her K+ as prescribed. Called and inform pt her potassium is 2.6 and ask her if she's having any symptoms. Pt states she's fine and she has been taking her K+ as prescribed. Pt states sometimes she forgets to take the evening dose. Inform pt the importance of taking her medication as prescribed to prevent muscle weakness and electrolyte imbalance. Pt verbalized understanding.

## 2025-04-25 NOTE — PROGRESS NOTES
Critical lab value called from lab on patient potassium of 2.6.  Reviewed patient's chart this level is going up from previous reported 2.3 potassium level.  Patient is currently taking potassium chloride 10 mEq 1 Po with each lasix.     KRISHAN Mata, ELLENP-C

## 2025-04-29 ENCOUNTER — RESULTS FOLLOW-UP (OUTPATIENT)
Dept: PRIMARY CARE CLINIC | Facility: CLINIC | Age: 60
End: 2025-04-29
Payer: COMMERCIAL

## 2025-04-29 DIAGNOSIS — E87.6 HYPOKALEMIA: Primary | ICD-10-CM

## 2025-04-29 RX ORDER — CYANOCOBALAMIN 1000 UG/ML
INJECTION, SOLUTION INTRAMUSCULAR; SUBCUTANEOUS
Qty: 10 ML | Refills: 5 | Status: SHIPPED | OUTPATIENT
Start: 2025-04-29

## 2025-04-29 RX ORDER — FERROUS SULFATE 325(65) MG
TABLET ORAL
Qty: 100 TABLET | Refills: 3 | Status: SHIPPED | OUTPATIENT
Start: 2025-04-29

## 2025-04-29 RX ORDER — POTASSIUM CHLORIDE 750 MG/1
CAPSULE, EXTENDED RELEASE ORAL
Qty: 180 CAPSULE | Refills: 3 | Status: SHIPPED | OUTPATIENT
Start: 2025-04-29

## 2025-05-16 ENCOUNTER — OFFICE VISIT (OUTPATIENT)
Dept: PRIMARY CARE CLINIC | Facility: CLINIC | Age: 60
End: 2025-05-16
Payer: COMMERCIAL

## 2025-05-16 ENCOUNTER — TELEPHONE (OUTPATIENT)
Dept: PRIMARY CARE CLINIC | Facility: CLINIC | Age: 60
End: 2025-05-16

## 2025-05-16 VITALS
RESPIRATION RATE: 18 BRPM | WEIGHT: 146.5 LBS | BODY MASS INDEX: 27.66 KG/M2 | TEMPERATURE: 98 F | DIASTOLIC BLOOD PRESSURE: 64 MMHG | HEIGHT: 61 IN | HEART RATE: 74 BPM | OXYGEN SATURATION: 97 % | SYSTOLIC BLOOD PRESSURE: 118 MMHG

## 2025-05-16 DIAGNOSIS — E53.8 VITAMIN B12 DEFICIENCY: ICD-10-CM

## 2025-05-16 DIAGNOSIS — Z12.31 ENCOUNTER FOR SCREENING MAMMOGRAM FOR MALIGNANT NEOPLASM OF BREAST: ICD-10-CM

## 2025-05-16 DIAGNOSIS — M25.532 LEFT WRIST PAIN: Primary | ICD-10-CM

## 2025-05-16 PROCEDURE — 99999 PR PBB SHADOW E&M-EST. PATIENT-LVL V: CPT | Mod: PBBFAC,,,

## 2025-05-16 RX ORDER — CYANOCOBALAMIN 1000 UG/ML
1000 INJECTION, SOLUTION INTRAMUSCULAR; SUBCUTANEOUS
Status: COMPLETED | OUTPATIENT
Start: 2025-05-16 | End: 2025-05-16

## 2025-05-16 RX ORDER — HYDROCODONE BITARTRATE AND ACETAMINOPHEN 5; 325 MG/1; MG/1
1 TABLET ORAL EVERY 6 HOURS PRN
Qty: 28 TABLET | Refills: 0 | Status: SHIPPED | OUTPATIENT
Start: 2025-05-16

## 2025-05-16 RX ADMIN — CYANOCOBALAMIN 1000 MCG: 1000 INJECTION, SOLUTION INTRAMUSCULAR; SUBCUTANEOUS at 02:05

## 2025-05-16 NOTE — TELEPHONE ENCOUNTER
Called and inform pt that NELLY Medrano think it's best to got to the ER to have your wrist in a splint. Pt verbalized understanding.

## 2025-05-16 NOTE — PROGRESS NOTES
Assessment:       1. Left wrist pain    2. Vitamin B12 deficiency    3. Encounter for screening mammogram for malignant neoplasm of breast       Plan:       Left wrist pain  -     WRIST BRACE FOR HOME USE  -     X-Ray Wrist Complete 3 views Left; Future; Expected date: 05/16/2025  -     X-Ray Hand 3 View Left; Future; Expected date: 05/16/2025  -     Ambulatory referral/consult to Orthopedics; Future; Expected date: 05/23/2025  -     HYDROcodone-acetaminophen (NORCO) 5-325 mg per tablet; Take 1 tablet by mouth every 6 (six) hours as needed for Pain.  Dispense: 28 tablet; Refill: 0    Vitamin B12 deficiency  -     Mammo Digital Screening Bilat w/ Yao (XPD); Future; Expected date: 05/16/2025  -     cyanocobalamin injection 1,000 mcg    Encounter for screening mammogram for malignant neoplasm of breast  -     Mammo Digital Screening Bilat w/ Yao (XPD); Future; Expected date: 05/16/2025    Assessment & Plan    - Suspect wrist fracture based on fall mechanism and inability to move hand.  - Considered possibility of multiple small fractures in hand bones, including potential scaphoid fracture due to fall mechanism.    TRAUMATIC RUPTURE OF LEFT WRIST:  - Left wrist appears swollen and immobile on physical exam, indicating probable rupture.  - Ordered XR Left Wrist and Hand to assess extent of injury.  - Referred patient to orthopedics (Kathie) for further evaluation of potential fracture.  - Prescribed short-term narcotic pain medication for 7 days for acute pain management.  - Recommend stabilization with a wrist brace over the weekend.    VITAMIN B12 DEFICIENCY:  - Started and administered vitamin B12 injection to address low levels and for overall health support.    FOLLOW-UP INSTRUCTIONS:  - Instructed patient to return to hospital if pain worsens or condition deteriorates over the weekend.           Subjective:    Patient ID: Sana Braden is a 60 y.o. female.  Chief Complaint: Fall and Wrist Pain (Left  "wrist)    Fall  Pertinent negatives include no abdominal pain, fever, headaches, nausea or vomiting.   Wrist Pain   Pertinent negatives include no fever.     History of Present Illness    Ms. Braden presents with a left wrist and hand injury following a fall.  Ms. Braden reports falling and injuring her left wrist and hand. She attempted to brace herself during the fall, resulting in her hand bending over and a popping sensation. She is unable to move her hand or fingers, including her thumb. The affected area is significantly swollen, particularly around the thumb. She reports severe pain in the wrist and hand area, indicating a need for strong pain medication. The injury extends from the wrist up into the hand, with pain reported in the scaphoid bone area upon exam. She describes a sensation of dislocation in the affected area. The injury is acute, having occurred recently enough that she has not yet sought treatment or used any home remedies.      Review of Systems   Constitutional:  Negative for appetite change, fatigue, fever and unexpected weight change.   HENT:  Negative for hearing loss and sore throat.    Eyes:  Negative for pain and visual disturbance.   Respiratory:  Negative for cough, shortness of breath and wheezing.    Cardiovascular:  Negative for chest pain, palpitations and leg swelling.   Gastrointestinal:  Negative for abdominal pain, blood in stool, constipation, diarrhea, nausea and vomiting.   Genitourinary:  Negative for dysuria.   Musculoskeletal:  Positive for joint swelling. Negative for arthralgias.   Neurological:  Negative for dizziness and headaches.   Psychiatric/Behavioral:  Negative for suicidal ideas.        Objective:      Vitals:    05/16/25 1359   BP: 118/64   BP Location: Right arm   Patient Position: Sitting   Pulse: 74   Resp: 18   Temp: 98 °F (36.7 °C)   TempSrc: Oral   SpO2: 97%   Weight: 66.4 kg (146 lb 7.9 oz)   Height: 5' 1" (1.549 m)     BP Readings from Last 5 " Encounters:   05/16/25 118/64   10/14/24 126/86   08/15/23 100/60   10/20/22 91/78   08/15/22 90/70     Wt Readings from Last 5 Encounters:   05/16/25 66.4 kg (146 lb 7.9 oz)   10/14/24 61.2 kg (134 lb 14.7 oz)   08/24/23 64.5 kg (142 lb 3.2 oz)   08/15/23 64.5 kg (142 lb 3.2 oz)   10/13/22 61.2 kg (135 lb)     Physical Exam  Vitals and nursing note reviewed.   Constitutional:       General: She is not in acute distress.  HENT:      Head: Atraumatic.   Cardiovascular:      Rate and Rhythm: Normal rate and regular rhythm.      Heart sounds: No murmur heard.  Pulmonary:      Effort: Pulmonary effort is normal. No respiratory distress.      Breath sounds: Normal breath sounds. No wheezing, rhonchi or rales.   Musculoskeletal:      Left wrist: Swelling, tenderness, bony tenderness and snuff box tenderness present. Decreased range of motion.      Cervical back: Normal range of motion.      Right lower leg: No edema.      Left lower leg: No edema.   Skin:     Capillary Refill: Capillary refill takes less than 2 seconds.   Neurological:      General: No focal deficit present.      Mental Status: She is alert and oriented to person, place, and time.      Gait: Gait normal.   Psychiatric:         Mood and Affect: Mood normal.         Thought Content: Thought content normal.           Lab Results   Component Value Date    WBC 6.45 04/25/2025    HGB 10.0 (L) 04/25/2025    HCT 32.5 (L) 04/25/2025     04/25/2025    CHOL 232 (H) 01/08/2025    TRIG 90 01/08/2025    HDL 90 (H) 01/08/2025    ALT 15 04/25/2025    AST 25 04/25/2025     (L) 04/25/2025    K 2.6 (LL) 04/25/2025    CL 96 04/25/2025    CREATININE 0.7 04/25/2025    BUN 11 04/25/2025    CO2 26 04/25/2025    TSH 1.085 01/08/2025    INR 0.8 02/08/2013    HGBA1C 5.3 01/08/2025      This note was generated with the assistance of ambient listening technology. Verbal consent was obtained by the patient and accompanying visitor(s) for the recording of patient  appointment to facilitate this note. I attest to having reviewed and edited the generated note for accuracy, though some syntax or spelling errors may persist. Please contact the author of this note for any clarification.    KRISHAN Mata, HUMBLE-C

## 2025-05-16 NOTE — PROGRESS NOTES
Verified pt by name and . Allergies verified. Per physician orders pt was administered B-12 1000mcg IM to right deltoid using aseptic technique. Pt tolerated well. No adverse effects or pain reported. MD notified.

## 2025-05-19 ENCOUNTER — PATIENT MESSAGE (OUTPATIENT)
Dept: PRIMARY CARE CLINIC | Facility: CLINIC | Age: 60
End: 2025-05-19
Payer: COMMERCIAL

## 2025-05-19 ENCOUNTER — RESULTS FOLLOW-UP (OUTPATIENT)
Dept: PRIMARY CARE CLINIC | Facility: CLINIC | Age: 60
End: 2025-05-19

## 2025-06-26 ENCOUNTER — PATIENT MESSAGE (OUTPATIENT)
Dept: PRIMARY CARE CLINIC | Facility: CLINIC | Age: 60
End: 2025-06-26
Payer: COMMERCIAL

## 2025-08-12 ENCOUNTER — PATIENT MESSAGE (OUTPATIENT)
Dept: INTERNAL MEDICINE | Facility: CLINIC | Age: 60
End: 2025-08-12
Payer: COMMERCIAL

## 2025-08-18 ENCOUNTER — OFFICE VISIT (OUTPATIENT)
Dept: PRIMARY CARE CLINIC | Facility: CLINIC | Age: 60
End: 2025-08-18
Payer: COMMERCIAL

## 2025-08-18 VITALS
HEIGHT: 61 IN | BODY MASS INDEX: 28.8 KG/M2 | DIASTOLIC BLOOD PRESSURE: 76 MMHG | RESPIRATION RATE: 18 BRPM | SYSTOLIC BLOOD PRESSURE: 118 MMHG | WEIGHT: 152.56 LBS | HEART RATE: 70 BPM | OXYGEN SATURATION: 98 %

## 2025-08-18 DIAGNOSIS — R60.0 PERIPHERAL EDEMA: ICD-10-CM

## 2025-08-18 DIAGNOSIS — S39.012D LUMBOSACRAL STRAIN, SUBSEQUENT ENCOUNTER: Primary | ICD-10-CM

## 2025-08-18 DIAGNOSIS — F41.9 ANXIETY: ICD-10-CM

## 2025-08-18 PROCEDURE — 99999 PR PBB SHADOW E&M-EST. PATIENT-LVL IV: CPT | Mod: PBBFAC,,, | Performed by: FAMILY MEDICINE

## 2025-08-18 PROCEDURE — 3044F HG A1C LEVEL LT 7.0%: CPT | Mod: CPTII,S$GLB,, | Performed by: FAMILY MEDICINE

## 2025-08-18 PROCEDURE — 3078F DIAST BP <80 MM HG: CPT | Mod: CPTII,S$GLB,, | Performed by: FAMILY MEDICINE

## 2025-08-18 PROCEDURE — 3074F SYST BP LT 130 MM HG: CPT | Mod: CPTII,S$GLB,, | Performed by: FAMILY MEDICINE

## 2025-08-18 PROCEDURE — 96372 THER/PROPH/DIAG INJ SC/IM: CPT | Mod: S$GLB,,, | Performed by: FAMILY MEDICINE

## 2025-08-18 PROCEDURE — 99214 OFFICE O/P EST MOD 30 MIN: CPT | Mod: 25,S$GLB,, | Performed by: FAMILY MEDICINE

## 2025-08-18 PROCEDURE — 1159F MED LIST DOCD IN RCRD: CPT | Mod: CPTII,S$GLB,, | Performed by: FAMILY MEDICINE

## 2025-08-18 PROCEDURE — 3008F BODY MASS INDEX DOCD: CPT | Mod: CPTII,S$GLB,, | Performed by: FAMILY MEDICINE

## 2025-08-18 RX ORDER — DICLOFENAC SODIUM 75 MG/1
75 TABLET, DELAYED RELEASE ORAL 2 TIMES DAILY
Qty: 60 TABLET | Refills: 5 | Status: SHIPPED | OUTPATIENT
Start: 2025-08-18

## 2025-08-18 RX ORDER — PREDNISONE 20 MG/1
20 TABLET ORAL DAILY
Qty: 7 TABLET | Refills: 0 | Status: SHIPPED | OUTPATIENT
Start: 2025-08-18 | End: 2025-08-25

## 2025-08-18 RX ORDER — TRIAMCINOLONE ACETONIDE 40 MG/ML
40 INJECTION, SUSPENSION INTRA-ARTICULAR; INTRAMUSCULAR ONCE
Status: COMPLETED | OUTPATIENT
Start: 2025-08-18 | End: 2025-08-18

## 2025-08-18 RX ORDER — METHOCARBAMOL 500 MG/1
TABLET, FILM COATED ORAL
Qty: 40 TABLET | Refills: 2 | Status: SHIPPED | OUTPATIENT
Start: 2025-08-18

## 2025-08-18 RX ORDER — HYDROCODONE BITARTRATE AND ACETAMINOPHEN 5; 325 MG/1; MG/1
1 TABLET ORAL EVERY 6 HOURS PRN
Qty: 30 TABLET | Refills: 0 | Status: SHIPPED | OUTPATIENT
Start: 2025-08-18

## 2025-08-18 RX ADMIN — TRIAMCINOLONE ACETONIDE 40 MG: 40 INJECTION, SUSPENSION INTRA-ARTICULAR; INTRAMUSCULAR at 04:08

## 2025-08-19 ENCOUNTER — TELEPHONE (OUTPATIENT)
Dept: OTOLARYNGOLOGY | Facility: CLINIC | Age: 60
End: 2025-08-19
Payer: COMMERCIAL

## 2025-08-19 ENCOUNTER — RESULTS FOLLOW-UP (OUTPATIENT)
Dept: PRIMARY CARE CLINIC | Facility: CLINIC | Age: 60
End: 2025-08-19
Payer: COMMERCIAL

## 2025-08-19 DIAGNOSIS — S32.030A COMPRESSION FRACTURE OF L3 LUMBAR VERTEBRA, CLOSED, INITIAL ENCOUNTER: Primary | ICD-10-CM

## 2025-08-31 ENCOUNTER — RESULTS FOLLOW-UP (OUTPATIENT)
Dept: PRIMARY CARE CLINIC | Facility: CLINIC | Age: 60
End: 2025-08-31
Payer: COMMERCIAL

## 2025-08-31 DIAGNOSIS — S34.109A CLOSED FRACTURE OF LUMBAR VERTEBRA WITH SPINAL CORD INJURY, INITIAL ENCOUNTER: Primary | ICD-10-CM

## 2025-08-31 DIAGNOSIS — S32.008A CLOSED FRACTURE OF LUMBAR VERTEBRA WITH SPINAL CORD INJURY, INITIAL ENCOUNTER: Primary | ICD-10-CM
